# Patient Record
Sex: MALE | Race: BLACK OR AFRICAN AMERICAN
[De-identification: names, ages, dates, MRNs, and addresses within clinical notes are randomized per-mention and may not be internally consistent; named-entity substitution may affect disease eponyms.]

---

## 2017-09-16 ENCOUNTER — HOSPITAL ENCOUNTER (INPATIENT)
Dept: HOSPITAL 24 - ER | Age: 62
LOS: 2 days | Discharge: HOME | DRG: 291 | End: 2017-09-18
Attending: INTERNAL MEDICINE | Admitting: INTERNAL MEDICINE
Payer: COMMERCIAL

## 2017-09-16 VITALS — BODY MASS INDEX: 23.9 KG/M2

## 2017-09-16 DIAGNOSIS — E88.09: ICD-10-CM

## 2017-09-16 DIAGNOSIS — J96.21: ICD-10-CM

## 2017-09-16 DIAGNOSIS — R94.31: ICD-10-CM

## 2017-09-16 DIAGNOSIS — E83.42: ICD-10-CM

## 2017-09-16 DIAGNOSIS — J96.22: ICD-10-CM

## 2017-09-16 DIAGNOSIS — J44.9: ICD-10-CM

## 2017-09-16 DIAGNOSIS — I50.23: Primary | ICD-10-CM

## 2017-09-16 DIAGNOSIS — I10: ICD-10-CM

## 2017-09-16 LAB
ALBUMIN SERPL BCP-MCNC: 2.9 G/DL (ref 3.4–5)
ALP SERPL-CCNC: 94 UNITS/L (ref 46–116)
ALT SERPL W P-5'-P-CCNC: 29 UNITS/L (ref 12–78)
AMORPH SED URNS QL MICRO: (no result) /HPF
ARTERIAL PATENCY WRIST A: (no result)
AST SERPL W P-5'-P-CCNC: 37 UNITS/L (ref 15–37)
BACTERIA URNS QL MICRO: NEGATIVE /HPF
BASE EXCESS BLDA CALC-SCNC: 0.9 MMOL/L (ref -2–2)
BASE EXCESS BLDA CALC-SCNC: 1.9 MMOL/L (ref -2–2)
BASE EXCESS BLDA CALC-SCNC: 3.6 MMOL/L (ref -2–2)
BASOPHILS # BLD AUTO: 0 X10^3/UL (ref 0–0.1)
BASOPHILS NFR BLD AUTO: 0.5 % (ref 0.2–1)
BILIRUB UR QL STRIP.AUTO: NEGATIVE
BUN SERPL-MCNC: 11 MG/DL (ref 7–18)
CALCIUM ALBUM COR SERPL-SCNC: 145 MMOL/L (ref 136–145)
CALCIUM ALBUM COR SERPL-SCNC: 9 MG/DL (ref 8.5–10.1)
CALCIUM SERPL-MCNC: 8.1 MG/DL (ref 8.5–10.1)
CHLORIDE SERPL-SCNC: 108 MMOL/L (ref 98–107)
CK MB SERPL-MCNC: 3.3 NG/ML (ref 0–4)
CK MB SERPL-MCNC: 4.6 NG/ML (ref 0–4)
CK MB SERPL-MCNC: 4.7 NG/ML (ref 0–4)
CK SERPL-CCNC: 245 UNITS/L (ref 39–308)
CK SERPL-CCNC: 270 UNITS/L (ref 39–308)
CK SERPL-CCNC: 272 UNITS/L (ref 39–308)
CKMB %: 1.4 % (ref ?–4)
CKMB %: 1.7 % (ref ?–4)
CKMB %: 1.7 % (ref ?–4)
CLARITY UR: CLEAR
CO2 SERPL-SCNC: 31.5 MMOL/L (ref 21–32)
COLOR UR AUTO: (no result)
CREAT SERPL-MCNC: 1.41 MG/DL (ref 0.7–1.3)
EGFR  BLACK RACES: > 60 (ref 60–?)
EOSINOPHIL # BLD AUTO: 0.3 X10^3/UL (ref 0–0.2)
EOSINOPHIL NFR BLD AUTO: 3.4 % (ref 0.9–2.9)
ERYTHROCYTE [DISTWIDTH] IN BLOOD BY AUTOMATED COUNT: 14.5 % (ref 11.6–16.5)
FRACTIONATED INSPIRED OXYGEN: 100
FRACTIONATED INSPIRED OXYGEN: 100
FRACTIONATED INSPIRED OXYGEN: 40
GLUCOSE UR QL STRIP.AUTO: NEGATIVE
HCO3 BLDA-SCNC: 30 MMOL/L (ref 22–26)
HCO3 BLDA-SCNC: 30.1 MMOL/L (ref 22–26)
HCO3 BLDA-SCNC: 30.4 MMOL/L (ref 22–26)
HCT VFR BLD AUTO: 39.5 % (ref 42–54)
HGB BLD-MCNC: 13 G/DL (ref 13.5–18)
KETONES UR QL STRIP.AUTO: NEGATIVE
LEUKOCYTE ESTERASE UR QL STRIP.AUTO: NEGATIVE
LYMPHOCYTES # BLD AUTO: 2.6 X10^3/UL (ref 1.3–2.9)
LYMPHOCYTES NFR BLD AUTO: 27.7 % (ref 21–51)
MAGNESIUM SERPL-MCNC: 1.2 MG/DL (ref 1.7–2.9)
MCH RBC QN AUTO: 29.1 PG (ref 27–34)
MCHC RBC AUTO-ENTMCNC: 32.8 G/DL (ref 33–35)
MCV RBC AUTO: 88.5 FL (ref 80–100)
MONOCYTES # BLD AUTO: 0.7 X10^3/UL (ref 0.3–0.8)
MONOCYTES NFR BLD AUTO: 7.7 % (ref 0–13)
NEUTROPHILS # BLD AUTO: 5.8 X10^3/UL (ref 2.2–4.8)
NEUTROPHILS NFR BLD AUTO: 60.7 % (ref 42–75)
NITRITE UR QL STRIP.AUTO: NEGATIVE
PCO2 BLDA: 55 MMHG (ref 35–45)
PCO2 BLDA: 64 MMHG (ref 35–45)
PCO2 BLDA: 70 MMHG (ref 35–45)
PH BLDA: 7.24 [PH] (ref 7.35–7.45)
PH BLDA: 7.28 [PH] (ref 7.35–7.45)
PH BLDA: 7.35 [PH] (ref 7.35–7.45)
PH UR STRIP.AUTO: 6 [PH] (ref 5–8)
PHOSPHATE SERPL-MCNC: 3.9 MG/DL (ref 2.6–4.7)
PLATELET # BLD: 292 X10^3/UL (ref 150–450)
PMV BLD AUTO: 7.8 FL (ref 7.4–11)
PO2 BLDA: 140 MMHG (ref 80–100)
PO2 BLDA: 199 MMHG (ref 80–100)
PO2 BLDA: 447 MMHG (ref 80–100)
PROT SERPL-MCNC: 7.2 G/DL (ref 6.4–8.2)
PROT UR QL STRIP.AUTO: (no result)
RBC # BLD AUTO: 4.46 X10^6/UL (ref 4.7–6)
RBC # UR STRIP.AUTO: (no result) /UL
SAO2 % BLDA: 100 % (ref 90–100)
SAO2 % BLDA: 100 % (ref 90–100)
SAO2 % BLDA: 99 % (ref 90–100)
SODIUM SERPL-SCNC: 144 MMOL/L (ref 136–145)
SP GR UR STRIP.AUTO: 1 (ref 1–1.03)
SQUAMOUS URNS QL MICRO: (no result) /HPF
TROPONIN I SERPL-MCNC: 0.12 NG/ML (ref 0–1.5)
TROPONIN I SERPL-MCNC: 0.17 NG/ML (ref 0–1.5)
TROPONIN I SERPL-MCNC: 0.22 NG/ML (ref 0–1.5)
UROBILINOGEN UR QL STRIP.AUTO: NORMAL
WBC NRBC COR # BLD AUTO: 9.5 X10^3/UL (ref 3.6–10)

## 2017-09-16 PROCEDURE — 36415 COLL VENOUS BLD VENIPUNCTURE: CPT

## 2017-09-16 PROCEDURE — 83735 ASSAY OF MAGNESIUM: CPT

## 2017-09-16 PROCEDURE — P9047 ALBUMIN (HUMAN), 25%, 50ML: HCPCS

## 2017-09-16 PROCEDURE — 82803 BLOOD GASES ANY COMBINATION: CPT

## 2017-09-16 PROCEDURE — 82553 CREATINE MB FRACTION: CPT

## 2017-09-16 PROCEDURE — 84484 ASSAY OF TROPONIN QUANT: CPT

## 2017-09-16 PROCEDURE — 85025 COMPLETE CBC W/AUTO DIFF WBC: CPT

## 2017-09-16 PROCEDURE — 99284 EMERGENCY DEPT VISIT MOD MDM: CPT

## 2017-09-16 PROCEDURE — 94640 AIRWAY INHALATION TREATMENT: CPT

## 2017-09-16 PROCEDURE — 71010: CPT

## 2017-09-16 PROCEDURE — 96374 THER/PROPH/DIAG INJ IV PUSH: CPT

## 2017-09-16 PROCEDURE — 51702 INSERT TEMP BLADDER CATH: CPT

## 2017-09-16 PROCEDURE — 96375 TX/PRO/DX INJ NEW DRUG ADDON: CPT

## 2017-09-16 PROCEDURE — 80053 COMPREHEN METABOLIC PANEL: CPT

## 2017-09-16 PROCEDURE — 93005 ELECTROCARDIOGRAM TRACING: CPT

## 2017-09-16 PROCEDURE — 94660 CPAP INITIATION&MGMT: CPT

## 2017-09-16 PROCEDURE — 83880 ASSAY OF NATRIURETIC PEPTIDE: CPT

## 2017-09-16 PROCEDURE — 84100 ASSAY OF PHOSPHORUS: CPT

## 2017-09-16 PROCEDURE — 81001 URINALYSIS AUTO W/SCOPE: CPT

## 2017-09-16 PROCEDURE — 36600 WITHDRAWAL OF ARTERIAL BLOOD: CPT

## 2017-09-16 PROCEDURE — 96367 TX/PROPH/DG ADDL SEQ IV INF: CPT

## 2017-09-16 PROCEDURE — 96365 THER/PROPH/DIAG IV INF INIT: CPT

## 2017-09-16 PROCEDURE — 82550 ASSAY OF CK (CPK): CPT

## 2017-09-16 RX ADMIN — SODIUM CHLORIDE SCH MLS/HR: 9 INJECTION, SOLUTION INTRAVENOUS at 20:22

## 2017-09-16 RX ADMIN — LOSARTAN POTASSIUM SCH MG: 50 TABLET, FILM COATED ORAL at 20:13

## 2017-09-16 RX ADMIN — SODIUM CHLORIDE SCH ML: 9 INJECTION, SOLUTION INTRAMUSCULAR; INTRAVENOUS; SUBCUTANEOUS at 18:35

## 2017-09-16 RX ADMIN — FUROSEMIDE SCH: 10 INJECTION, SOLUTION INTRAVENOUS at 09:52

## 2017-09-16 RX ADMIN — IPRATROPIUM BROMIDE AND ALBUTEROL SULFATE SCH EA: .5; 3 SOLUTION RESPIRATORY (INHALATION) at 04:30

## 2017-09-16 RX ADMIN — FUROSEMIDE SCH MG: 10 INJECTION, SOLUTION INTRAVENOUS at 08:30

## 2017-09-16 RX ADMIN — AMLODIPINE BESYLATE SCH MG: 5 TABLET ORAL at 12:23

## 2017-09-16 RX ADMIN — POTASSIUM CHLORIDE SCH MEQ: 1500 TABLET, EXTENDED RELEASE ORAL at 20:12

## 2017-09-16 RX ADMIN — IPRATROPIUM BROMIDE AND ALBUTEROL SULFATE SCH: .5; 3 SOLUTION RESPIRATORY (INHALATION) at 06:34

## 2017-09-16 RX ADMIN — IPRATROPIUM BROMIDE AND ALBUTEROL SULFATE SCH EA: .5; 3 SOLUTION RESPIRATORY (INHALATION) at 21:21

## 2017-09-16 RX ADMIN — IPRATROPIUM BROMIDE AND ALBUTEROL SULFATE SCH EA: .5; 3 SOLUTION RESPIRATORY (INHALATION) at 12:25

## 2017-09-16 RX ADMIN — POTASSIUM CHLORIDE SCH MEQ: 1500 TABLET, EXTENDED RELEASE ORAL at 12:23

## 2017-09-16 RX ADMIN — ENALAPRIL MALEATE SCH: 10 TABLET ORAL at 09:52

## 2017-09-16 RX ADMIN — SODIUM CHLORIDE SCH: 9 INJECTION, SOLUTION INTRAVENOUS at 09:53

## 2017-09-16 RX ADMIN — ENALAPRIL MALEATE SCH MG: 10 TABLET ORAL at 08:30

## 2017-09-16 RX ADMIN — FUROSEMIDE SCH MG: 10 INJECTION, SOLUTION INTRAVENOUS at 20:11

## 2017-09-16 RX ADMIN — SODIUM CHLORIDE SCH ML: 9 INJECTION, SOLUTION INTRAMUSCULAR; INTRAVENOUS; SUBCUTANEOUS at 23:17

## 2017-09-16 RX ADMIN — IPRATROPIUM BROMIDE AND ALBUTEROL SULFATE SCH EA: .5; 3 SOLUTION RESPIRATORY (INHALATION) at 08:27

## 2017-09-16 RX ADMIN — IPRATROPIUM BROMIDE AND ALBUTEROL SULFATE SCH EA: .5; 3 SOLUTION RESPIRATORY (INHALATION) at 16:25

## 2017-09-16 NOTE — DR.PEDCOUG
HPI





- Time Seen


Time seen: 04:10





- Complaint


Chief Complaint Doctors Comments: Patient presented to the ED via POV with 

complaint that could not breath. He was diaphoretic in respiratory distress 

with intercostal rectractions. He was hypertensive /141.





PMH





- Past Surgical History


Past Surgical History: Yes (unknown)





- Vaccines


Hx Measles, Mumps, Rubella Vaccination: Yes


Hx Varicella Vaccination: Yes


Pneumococcal Vaccine Every 5 Yrs: No


Hx Meningococcal Vaccination: Yes





ROS (Ped)





- Review of Systems


Constitutional: Diaphoresis


Eyes: No Symptoms Reported


ENTM: No Symptoms Reported


Respiratoy: Short of Breath


Cardiovascular: No Symptoms Reported


Gastrointestinal/Abdominal: No Symptoms Reported


Genitourinary: No Symptoms Reported


Neurological: No Symptoms Reported


Musculoskeletal: No Symptoms Reported


Integumentary: No Symptoms Reported


Hematologic/Lymphatic: No Symptoms Reported


Endocrine: No Symptoms Reported


Psychiatric: No Symptoms Reported


All Other Systems: Reviewed and Negative





PE





- Vitals


Vitals: 


 





Temperature                      99.0 F


Pulse Rate [Apical]              89


Pulse Rate                       110


Respiratory Rate                 22


Blood Pressure [Right Arm]       166/94


Blood Pressure [Left Arm]        158/86


Blood Pressure                   258/134


O2 Sat by Pulse Oximetry         100











- General


Limitations: No Limitations





- Head


Head Exam: Normal Inspection, Atraumatic





- Eyes


Eye exam: Normal Appearance, PERRL, EOMI





- ENT


ENT Exam: Normal Exam


External Ear Exam: Normal External Inspection


TM/Canal Exam: Bilateral Normal


Nose Exam: Normal Nose Exam


Nasal Speculum Exam: Bilateral Normal


Mouth Exam: Normal Inspection


Teeth Exam: Normal Inspection


Throat Exam: Normal Inspection





- Neck


Neck Exam: Other (supraclavicular retraction)





- Chest


Chest Inspection: Normal Inspection, Other (intercostal retraction)





- Respiratory


Respiratory Exam: Accessory Muscle Use, Respiratory Distress


Respiratory Exam: Bilateral Wheezing (inspiratory/exp)





- Cardiovascular


Cardiovascular Exam: Tachycardia





- Abdominal Exam


Abdominal Exam: Normal Inspection


Abdominal Tenderness: negative: RUQ, RLQ, LUQ, LLQ, Epigastrium, Suprapubic, 

Diffuse, Mild, Moderate, Severe, Other





- Extremities


Extremities Exam: Normal Inspection, Full ROM





- Back


Back Exam: Normal Inspection





- Neurologic


Neurological Exam: Alert, Oriented X3, CN II-XII Intact





- Psychiatric


Psychiatric Exam: Normal Affect





- Skin


Skin Exam: Warm, Dry, Intact





Course





- Treatment


Treatment: DuoNebs,oxygen,antihypertensive,NTG, morphine, Non Rebreather





- Reevaluation


1st: Improved





- Consultation


Called: 06:10 (Dr Landrum agreed to admit for further evaluation and treatment)





ROR





- Labs Reviewed


Laboratory Results Reviewed?: Yes


Result Diagrams: 


 09/16/17 04:15





 09/16/17 04:15


Laboratory: 


 











WBC  9.5 X10^3/uL (3.6-10.0)   09/16/17  04:15    


 


RBC  4.46 X10^6/uL (4.7-6.0)  L  09/16/17  04:15    


 


Hgb  13.0 g/dL (13.5-18.0)  L  09/16/17  04:15    


 


Hct  39.5 % (42.0-54.0)  L  09/16/17  04:15    


 


MCV  88.5 fL (80.0-100.0)   09/16/17  04:15    


 


MCH  29.1 pg (27.0-34.0)   09/16/17  04:15    


 


MCHC  32.8 g/dL (33.0-35.0)  L  09/16/17  04:15    


 


RDW  14.5 % (11.6-16.5)   09/16/17  04:15    


 


Plt Count  292 X10^3/uL (150.0-450.0)   09/16/17  04:15    


 


MPV  7.8 fL (7.4-11.0)   09/16/17  04:15    


 


Neut %  60.7 % (42.0-75.0)   09/16/17  04:15    


 


Lymph %  27.7 % (21.0-51.0)   09/16/17  04:15    


 


Mono %  7.7 % (0.0-13.0)   09/16/17  04:15    


 


Eos %  3.4 % (0.9-2.9)  H  09/16/17  04:15    


 


Baso %  0.5 % (0.2-1.0)   09/16/17  04:15    


 


Neut #  5.8 x10^3/uL (2.2-4.8)  H  09/16/17  04:15    


 


Lymph #  2.6 X10^3/uL (1.3-2.9)   09/16/17  04:15    


 


Mono #  0.7 x10^3/uL (0.3-0.8)   09/16/17  04:15    


 


Eos #  0.3 x10^3/uL (0.0-0.2)  H  09/16/17  04:15    


 


Baso #  0.0 X10^3/uL (0.0-0.1)   09/16/17  04:15    


 


Absolute Nucleated RBC  0.0 /100WBC  09/16/17  04:15    


 


Sample Site  R rad   09/16/17  05:33    


 


ABG pH  7.240  (7.35-7.45)  L  09/16/17  05:33    


 


ABG pCO2  70.0 mmHg (35.0-45.0)  H*  09/16/17  05:33    


 


ABG pO2  447.0 mmHg (80.0-100.0)  H  09/16/17  05:33    


 


ABG HCO3  30.0 mmol/L (22-26)  H  09/16/17  05:33    


 


ABG O2 Saturation  100.0 % ()   09/16/17  05:33    


 


ABG Base Excess  0.9 mmol/L (-2.0-2.0)   09/16/17  05:33    


 


Eze Test  Pos   09/16/17  05:33    


 


A-a Gradient  179.0 mmHg  09/16/17  05:33    


 


FiO2  100.000   09/16/17  05:33    


 


Blood Gas Comments  Yaima well, afh   09/16/17  05:33    


 


Sodium  144 mmol/L (136-145)   09/16/17  04:15    


 


Corrected Sodium  145 mmol/L (136-145)   09/16/17  04:15    


 


Potassium  3.4 mmol/L (3.5-5.1)  L  09/16/17  04:15    


 


Chloride  108 mmol/L ()  H  09/16/17  04:15    


 


Carbon Dioxide  31.5 mmol/L (21-32)   09/16/17  04:15    


 


BUN  11 mg/dL (7-18)   09/16/17  04:15    


 


Creatinine  1.41 mg/dL (0.70-1.30)  H  09/16/17  04:15    


 


Est GFR (MDRD) Af Amer  > 60  (>60)   09/16/17  04:15    


 


Est GFR (MDRD) Non-Af  54  (>60)  L  09/16/17  04:15    


 


Glucose  127 mg/dL (65-99)  H  09/16/17  04:15    


 


Calcium  8.1 mg/dL (8.5-10.1)  L  09/16/17  04:15    


 


Corrected Calcium  9.0 mg/dL (8.5-10.1)   09/16/17  04:15    


 


Phosphorus  3.9 mg/dL (2.6-4.7)   09/16/17  04:15    


 


Magnesium  1.2 mg/dL (1.7-2.9)  L  09/16/17  04:15    


 


Total Bilirubin  0.40 mg/dL (0.2-1.0)   09/16/17  04:15    


 


AST  37 Units/L (15-37)   09/16/17  04:15    


 


ALT  29 Units/L (12-78)   09/16/17  04:15    


 


Alkaline Phosphatase  94 Units/L ()   09/16/17  04:15    


 


Creatine Kinase  245 Units/L ()   09/16/17  04:15    


 


CK-MB (CK-2)  3.3 ng/mL (0-4.0)   09/16/17  04:15    


 


CK/CKMB % Calc  1.4 % (<4)   09/16/17  04:15    


 


Troponin I  0.22 ng/mL (0-1.5)   09/16/17  04:15    


 


B-Natriuretic Peptide  274 pg/mL (0-79)  H  09/16/17  04:15    


 


Total Protein  7.2 g/dL (6.4-8.2)   09/16/17  04:15    


 


Albumin  2.9 g/dL (3.4-5.0)  L  09/16/17  04:15    


 


Globulin  4.3 g/dL (2.5-4.5)   09/16/17  04:15    


 


Albumin/Globulin Ratio  0.7 Ratio (1.1-2.1)  L  09/16/17  04:15    














- XRAY


XRAY Interpreted by: Radiologist (Chest: The heart is moderately enlarged and 

there is central vascular congestion.  The interstitial markings are prominent 

bilaterally.  No pneumothorax or pleural effusion.  No focal lung parenchymal 

consolidation identified. )





- Diagnosis


Discharge Problem: 


CHF (congestive heart failure), NYHA class II


Qualifiers:


 Congestive heart failure type: systolic Congestive heart failure chronicity: 

acute on chronic Qualified Code(s): I50.23 - Acute on chronic systolic (

congestive) heart failure








- Discharge Plan


Condition: Stable





- Follow ups/Referrals


Follow ups/Referrals: 


HILLARY LARIOS [Primary Care Provider] - 3 days





- Instructions

## 2017-09-16 NOTE — DR.H&P
H&P





- History & Physical for Day of:


H&P Date: 09/16/17





- Chief Complaint


Chief Complaint: SHORTNESS OF BREATH





- Allergies


Allergies/Adverse Reactions: 


 Allergies











Allergy/AdvReac Type Severity Reaction Status Date / Time


 


lisinopril Allergy   Verified 09/16/17 06:21














- History of Present Illness


History of Present Illness:  IS A 62 YEAR OLD PATIENT OF OURS WHO 

PRESENTED TO THE EMERGENCY ROOM WITH COMPLAINTS OF DIFFICULTY BREATHING. ON 

ARRIVAL, PATIENT WAS IN OBVIOUS RESPIRATORY DISTESS. HE WAS NOTED WITH 

INTERCOSTAL RETRACTIONS AND WAS DIAPHORETIC. PATIENT REPORTED THAT SYMPTOMS 

BEGAN 1 HOUR PRIOR TO ARRIVAL TO ER. PATIENT WAS ALSO NOTED TO BE HYPERTENSIVE 

ON ARRIVAL TO ER WITH BLOOD PRESSURE NOTED /134. OTHER VITAL SIGNS ON 

ARRIVAL ARE 99.0-135-36-93%. LABS AND XRAYS WERE OBTAINED. ABNORMAL LAB VALUES 

INCLUDE RBC 4.46, HGB 13, HCT 39.5, POTASSIUM 3.4, CHLORIDE 108, CREATININE 1.41

, GLUCOSE 127, CALCIUM 8.1, MAGNESIUM 1.2, ALBUMIN 2.9.  URINALYSIS REPORTED 

WBC 1-4, RBC 4-8, PROTEIN 3+, OCCULT BLOOD 2+. ABG REPORTED PH 7.280, PC02 64, 

P02 199, HC03 30.1. CHEST XRAY REPORTED FINDINGS MOST CHARACTERISTIC OF CHANGES 

ASSOCIATED WITH CONGESTIVE HEART FAIURE. THERE IS CARDIOMEGALY, CENTRAL 

VASCULAR CONGESTION, AND INTERSTITIAL EDEMA. PATIENT WAS PLACED ON BIPAP. HE 

WAS GIVEN SOLUMEDROL 125MG, NORMODYNE 20MG IV, DECADRON TX, ATIVAN 1MG IV, 

LASIX 40MG IV, MORPHINE 4MG IV, NITRO-BID OINTMENT TO CHEST WALL, MAGNESIUM 

RIDER X 2, AND VASOTEC 10MG. WE ADMITTED PATIENT FOR FURTHER TREATMENT AND 

EVALUATION OF CHF. HE WAS STARTED ON NS AT 50ML/HR, LASIX 40MG IV BID, DUONEB TX

, LISOPRIL 10MG DAILY, ZOFRAN 4MG IV Q8H PRN, LOSARTAN 100MG HS AMLODIPINE 5MG 

DAILY, AND POTASSIUM 40MEQ PO BID X 2 DOSES. WE PLAN TO RECHECK AM LABS AND 

CONTINUE TO MONITOR PATIENT.





- Past Medical History


Past Medical History: Arthritis, Asthma, CHF, COPD, CVA, Hypertension, PUD





- Past Surgical History


Additional Surgical History: Prostate Surgery, Right Cataract Surgery





- Family History


Family Medical History: Hypertension





- Social History


Does patient currently use any type of tobacco product: Yes


Have you used tobacco products in the last 12 months: Yes


Type of Tobacco Use: Cigarettes


How many years tobacco product used: 50


Alcohol Use: Occasionally


Drug Use: None





- Medications


Home Medications: 


 





Amlodipine Besylate [NORVASC 10 MG *] 10 mg PO DAILY 09/16/17 [History 

Confirmed 09/16/17]


Aspirin [ASPIRIN 325 MG *] 325 mg PO DAILY 09/16/17 [History Confirmed 09/16/17]


Budesonide-Formoterol [SYMBICORT /4.5 mcg] 1 puff IN Q12H 09/16/17 [

History Confirmed 09/16/17]


Hydralazine HCl 25 mg PO DAILY 09/16/17 [History Confirmed 09/16/17]


Metoprolol Tartrate [Lopressor Tab 50 mg] 100 mg PO HS 09/16/17 [History 

Confirmed 09/16/17]


Tamsulosin HCl [Flomax] 0.4 mg PO DAILY 09/16/17 [History Confirmed 09/16/17]











- Review of Systems


Constitutional: No Symptoms Reported


Eyes: No Symptoms Reported


ENT: No Symptoms Reported


Respiratory: See HPI, Shortness of Breath, Wheezing


Cardiovascular: No Symptoms Reported


Gastrointestinal: No Symptoms Reported


Genitourinary: No Symptoms Reported


Musculoskeletal: No Symptoms Reported


Skin: No Symptoms Reported


Neurological: No Symptoms Reported





- Physical Exam


Vital Signs: 


 





Temperature                      98.4 F


Pulse Rate [Apical]              88


Pulse Rate                       100


Respiratory Rate                 23


Blood Pressure [Right Arm]       191/87


Blood Pressure [Left Arm]        158/86


Blood Pressure                   258/134


O2 Sat by Pulse Oximetry         95








Oriented: Normal


Eyes: Normal


Ear: Normal


Nose: Normal


Throat: Normal


Respiratory: Wheezes Throughout


Cardiovascular: Tachycardia


: Normal


Auscultation: Bowel Sounds: Normal


Palpation: Normal


Tenderness: Normal


Skin: Normal


Musculoskeletal: Normal


Psychiatric: Normal


Mood Description: Calm


Affect: Normal


Speech Pattern: Clear





- Assessment/Plan


(1) CHF (congestive heart failure), NYHA class II


Qualifiers: 


   Congestive heart failure type: systolic   Congestive heart failure chronicity

: acute on chronic   Qualified Code(s): I50.23 - Acute on chronic systolic (

congestive) heart failure   


Status: Acute   


Plan: LASIX 40MG IV BID, DUONEBS, CONTINUE TO MONTITOR   





(2) Acute and chronic respiratory failure (acute-on-chronic)


Qualifiers: 


   Respiratory failure complication: hypoxia and hypercapnia   Qualified Code(s)

: J96.21 - Acute and chronic respiratory failure with hypoxia; J96.22 - Acute 

and chronic respiratory failure with hypercapnia   


Status: Acute   


Plan: DUONEB TX, PULMOCORT TX, LASIX 40MG IV BID, SUPPLEMENTAL OXYGEN, TELEMETRY

, CONTINUE TO MONITOR   





(3) COPD (chronic obstructive pulmonary disease)


Qualifiers: 


   COPD type: unspecified COPD   Qualified Code(s): J44.9 - Chronic obstructive 

pulmonary disease, unspecified   


Status: Acute   


Plan: DUONEB TX, PULMOCORT TX, LASIX 40MG IV BID, SUPPLEMENTAL OXYGEN, TELEMETRY

, CONTINUE TO MONITOR   





(4) Hypertension


Qualifiers: 


   Hypertension type: essential hypertension   Qualified Code(s): I10 - 

Essential (primary) hypertension   


Status: Acute   


Plan: LISINOPRIL 10MG DAILY, LOSARTAN 100MG HS, AMLODIPINE 5MG DAILY

## 2017-09-16 NOTE — RAD
EXAM:   Chest X-ray



INDICATION:  

Shortness of breath

 

COMPARISION:  

Prior exam from December 31, 2016

 

TECHNIQUE:  

Single view

 

FINDINGS:  

The heart is moderately enlarged and there is central vascular congestion. The interstitial markings 
are prominent bilaterally. No pneumothorax or pleural effusion. No focal lung parenchymal consolidati
on identified. The regional skeleton is intact.

 

IMPRESSION:  

Findings are most characteristic of changes associated congestive heart failure. There is cardiomegal
y, central vascular congestion, and interstitial edema.





Reported By:Electronically Signed by ANASTASIIA SNELL MD at 9/16/2017 4:55:10 AM

## 2017-09-17 LAB
ALBUMIN SERPL BCP-MCNC: 2.5 G/DL (ref 3.4–5)
ALP SERPL-CCNC: 74 UNITS/L (ref 46–116)
ALT SERPL W P-5'-P-CCNC: 30 UNITS/L (ref 12–78)
ARTERIAL PATENCY WRIST A: (no result)
AST SERPL W P-5'-P-CCNC: 30 UNITS/L (ref 15–37)
BASE EXCESS BLDA CALC-SCNC: 7 MMOL/L (ref -2–2)
BASOPHILS # BLD AUTO: 0 X10^3/UL (ref 0–0.1)
BASOPHILS NFR BLD AUTO: 0 % (ref 0.2–1)
BUN SERPL-MCNC: 24 MG/DL (ref 7–18)
CALCIUM ALBUM COR SERPL-SCNC: (no result) MMOL/L (ref 136–145)
CALCIUM ALBUM COR SERPL-SCNC: 9.6 MG/DL (ref 8.5–10.1)
CALCIUM SERPL-MCNC: 8.4 MG/DL (ref 8.5–10.1)
CHLORIDE SERPL-SCNC: 106 MMOL/L (ref 98–107)
CO2 SERPL-SCNC: 29.5 MMOL/L (ref 21–32)
CREAT SERPL-MCNC: 1.48 MG/DL (ref 0.7–1.3)
EGFR  BLACK RACES: > 60 (ref 60–?)
EOSINOPHIL # BLD AUTO: 0 X10^3/UL (ref 0–0.2)
EOSINOPHIL NFR BLD AUTO: 0 % (ref 0.9–2.9)
ERYTHROCYTE [DISTWIDTH] IN BLOOD BY AUTOMATED COUNT: 14.2 % (ref 11.6–16.5)
FRACTIONATED INSPIRED OXYGEN: 21
HCO3 BLDA-SCNC: 32 MMOL/L (ref 22–26)
HCT VFR BLD AUTO: 35.5 % (ref 42–54)
HGB BLD-MCNC: 11.9 G/DL (ref 13.5–18)
LYMPHOCYTES # BLD AUTO: 0.6 X10^3/UL (ref 1.3–2.9)
LYMPHOCYTES NFR BLD AUTO: 5 % (ref 21–51)
MCH RBC QN AUTO: 28.9 PG (ref 27–34)
MCHC RBC AUTO-ENTMCNC: 33.5 G/DL (ref 33–35)
MCV RBC AUTO: 86.2 FL (ref 80–100)
MONOCYTES # BLD AUTO: 0.7 X10^3/UL (ref 0.3–0.8)
MONOCYTES NFR BLD AUTO: 6 % (ref 0–13)
NEUTROPHILS # BLD AUTO: 10 X10^3/UL (ref 2.2–4.8)
NEUTROPHILS NFR BLD AUTO: 89 % (ref 42–75)
PCO2 BLDA: 46 MMHG (ref 35–45)
PH BLDA: 7.45 [PH] (ref 7.35–7.45)
PLATELET # BLD: 238 X10^3/UL (ref 150–450)
PMV BLD AUTO: 8.5 FL (ref 7.4–11)
PO2 BLDA: 86 MMHG (ref 80–100)
PROT SERPL-MCNC: 6.1 G/DL (ref 6.4–8.2)
RBC # BLD AUTO: 4.12 X10^6/UL (ref 4.7–6)
SAO2 % BLDA: 97 % (ref 90–100)
SODIUM SERPL-SCNC: 142 MMOL/L (ref 136–145)
WBC NRBC COR # BLD AUTO: 11.2 X10^3/UL (ref 3.6–10)

## 2017-09-17 RX ADMIN — FUROSEMIDE SCH MG: 10 INJECTION, SOLUTION INTRAVENOUS at 08:24

## 2017-09-17 RX ADMIN — MAGNESIUM SULFATE HEPTAHYDRATE SCH MLS/HR: 1 INJECTION, SOLUTION INTRAVENOUS at 08:17

## 2017-09-17 RX ADMIN — ALBUMIN (HUMAN) SCH MLS/HR: 0.25 INJECTION, SOLUTION INTRAVENOUS at 10:18

## 2017-09-17 RX ADMIN — AMLODIPINE BESYLATE SCH MG: 5 TABLET ORAL at 08:24

## 2017-09-17 RX ADMIN — LOSARTAN POTASSIUM SCH MG: 50 TABLET, FILM COATED ORAL at 20:08

## 2017-09-17 RX ADMIN — MAGNESIUM SULFATE HEPTAHYDRATE SCH MLS/HR: 1 INJECTION, SOLUTION INTRAVENOUS at 07:15

## 2017-09-17 RX ADMIN — SODIUM CHLORIDE SCH ML: 9 INJECTION, SOLUTION INTRAMUSCULAR; INTRAVENOUS; SUBCUTANEOUS at 23:17

## 2017-09-17 RX ADMIN — IPRATROPIUM BROMIDE AND ALBUTEROL SULFATE SCH EA: .5; 3 SOLUTION RESPIRATORY (INHALATION) at 01:30

## 2017-09-17 RX ADMIN — IPRATROPIUM BROMIDE AND ALBUTEROL SULFATE SCH: .5; 3 SOLUTION RESPIRATORY (INHALATION) at 09:10

## 2017-09-17 RX ADMIN — SODIUM CHLORIDE SCH MLS/HR: 9 INJECTION, SOLUTION INTRAVENOUS at 21:59

## 2017-09-17 RX ADMIN — IPRATROPIUM BROMIDE AND ALBUTEROL SULFATE SCH EA: .5; 3 SOLUTION RESPIRATORY (INHALATION) at 20:26

## 2017-09-17 RX ADMIN — BUDESONIDE SCH EA: 0.5 INHALANT RESPIRATORY (INHALATION) at 23:15

## 2017-09-17 RX ADMIN — METOPROLOL SUCCINATE SCH MG: 25 TABLET, EXTENDED RELEASE ORAL at 10:21

## 2017-09-17 RX ADMIN — FUROSEMIDE SCH MG: 10 INJECTION, SOLUTION INTRAVENOUS at 20:08

## 2017-09-17 RX ADMIN — SODIUM CHLORIDE SCH: 9 INJECTION, SOLUTION INTRAMUSCULAR; INTRAVENOUS; SUBCUTANEOUS at 18:35

## 2017-09-17 RX ADMIN — SODIUM CHLORIDE SCH ML: 9 INJECTION, SOLUTION INTRAMUSCULAR; INTRAVENOUS; SUBCUTANEOUS at 06:17

## 2017-09-17 RX ADMIN — SODIUM CHLORIDE SCH: 9 INJECTION, SOLUTION INTRAVENOUS at 10:21

## 2017-09-17 RX ADMIN — IPRATROPIUM BROMIDE AND ALBUTEROL SULFATE SCH EA: .5; 3 SOLUTION RESPIRATORY (INHALATION) at 16:34

## 2017-09-17 RX ADMIN — MAGNESIUM SULFATE HEPTAHYDRATE SCH MLS/HR: 1 INJECTION, SOLUTION INTRAVENOUS at 07:16

## 2017-09-17 RX ADMIN — IPRATROPIUM BROMIDE AND ALBUTEROL SULFATE SCH EA: .5; 3 SOLUTION RESPIRATORY (INHALATION) at 05:36

## 2017-09-17 RX ADMIN — IPRATROPIUM BROMIDE AND ALBUTEROL SULFATE SCH EA: .5; 3 SOLUTION RESPIRATORY (INHALATION) at 13:22

## 2017-09-17 NOTE — RAD
HISTORY:  CHF



Study: AP chest. 



Comparison: 9/16/2017



Findings:

Allowing for differences in technique, positioning, and degree of inspiration, there has been no sign
ificant interval change in the radiographic appearance of the chest.  

Mild prominence the central pulmonary vasculature and perihilar interstitium noted. No focal airspace
 opacities are seen.

Cardiac silhouette is mildly enlarged, stable.  



IMPRESSION: 

 

1.  No significant interval change with findings as above. Stable cardiomegaly and mild venous conges
tive changes are noted.





Reported By:Electronically Signed by BARRETT LEYVA MD at 9/17/2017 7:03:13 AM

## 2017-09-18 VITALS — DIASTOLIC BLOOD PRESSURE: 99 MMHG | SYSTOLIC BLOOD PRESSURE: 134 MMHG

## 2017-09-18 LAB
ALBUMIN SERPL BCP-MCNC: 2.9 G/DL (ref 3.4–5)
ALP SERPL-CCNC: 74 UNITS/L (ref 46–116)
ALT SERPL W P-5'-P-CCNC: 27 UNITS/L (ref 12–78)
AST SERPL W P-5'-P-CCNC: 25 UNITS/L (ref 15–37)
BASOPHILS # BLD AUTO: 0 X10^3/UL (ref 0–0.1)
BASOPHILS NFR BLD AUTO: 0.3 % (ref 0.2–1)
BUN SERPL-MCNC: 21 MG/DL (ref 7–18)
CALCIUM ALBUM COR SERPL-SCNC: (no result) MMOL/L (ref 136–145)
CALCIUM ALBUM COR SERPL-SCNC: 9.5 MG/DL (ref 8.5–10.1)
CALCIUM SERPL-MCNC: 8.6 MG/DL (ref 8.5–10.1)
CHLORIDE SERPL-SCNC: 102 MMOL/L (ref 98–107)
CO2 SERPL-SCNC: 32.1 MMOL/L (ref 21–32)
CREAT SERPL-MCNC: 1.45 MG/DL (ref 0.7–1.3)
EGFR  BLACK RACES: > 60 (ref 60–?)
EOSINOPHIL # BLD AUTO: 0.1 X10^3/UL (ref 0–0.2)
EOSINOPHIL NFR BLD AUTO: 0.4 % (ref 0.9–2.9)
ERYTHROCYTE [DISTWIDTH] IN BLOOD BY AUTOMATED COUNT: 14.2 % (ref 11.6–16.5)
HCT VFR BLD AUTO: 37.8 % (ref 42–54)
HGB BLD-MCNC: 12.6 G/DL (ref 13.5–18)
LYMPHOCYTES # BLD AUTO: 1.4 X10^3/UL (ref 1.3–2.9)
LYMPHOCYTES NFR BLD AUTO: 10.3 % (ref 21–51)
MCH RBC QN AUTO: 28.6 PG (ref 27–34)
MCHC RBC AUTO-ENTMCNC: 33.4 G/DL (ref 33–35)
MCV RBC AUTO: 85.6 FL (ref 80–100)
MONOCYTES # BLD AUTO: 1.1 X10^3/UL (ref 0.3–0.8)
MONOCYTES NFR BLD AUTO: 7.6 % (ref 0–13)
NEUTROPHILS # BLD AUTO: 11.3 X10^3/UL (ref 2.2–4.8)
NEUTROPHILS NFR BLD AUTO: 81.4 % (ref 42–75)
PLATELET # BLD: 241 X10^3/UL (ref 150–450)
PMV BLD AUTO: 8.4 FL (ref 7.4–11)
PROT SERPL-MCNC: 6.9 G/DL (ref 6.4–8.2)
RBC # BLD AUTO: 4.41 X10^6/UL (ref 4.7–6)
SODIUM SERPL-SCNC: 141 MMOL/L (ref 136–145)
WBC NRBC COR # BLD AUTO: 13.9 X10^3/UL (ref 3.6–10)

## 2017-09-18 RX ADMIN — IPRATROPIUM BROMIDE AND ALBUTEROL SULFATE SCH EA: .5; 3 SOLUTION RESPIRATORY (INHALATION) at 04:41

## 2017-09-18 RX ADMIN — SODIUM CHLORIDE SCH ML: 9 INJECTION, SOLUTION INTRAMUSCULAR; INTRAVENOUS; SUBCUTANEOUS at 05:59

## 2017-09-18 RX ADMIN — BUDESONIDE SCH EA: 0.5 INHALANT RESPIRATORY (INHALATION) at 09:43

## 2017-09-18 RX ADMIN — IPRATROPIUM BROMIDE AND ALBUTEROL SULFATE SCH EA: .5; 3 SOLUTION RESPIRATORY (INHALATION) at 09:43

## 2017-09-18 RX ADMIN — SODIUM CHLORIDE SCH: 9 INJECTION, SOLUTION INTRAMUSCULAR; INTRAVENOUS; SUBCUTANEOUS at 13:12

## 2017-09-18 RX ADMIN — SODIUM CHLORIDE SCH: 9 INJECTION, SOLUTION INTRAVENOUS at 10:19

## 2017-09-18 RX ADMIN — FUROSEMIDE SCH MG: 10 INJECTION, SOLUTION INTRAVENOUS at 08:37

## 2017-09-18 RX ADMIN — IPRATROPIUM BROMIDE AND ALBUTEROL SULFATE SCH EA: .5; 3 SOLUTION RESPIRATORY (INHALATION) at 14:00

## 2017-09-18 RX ADMIN — IPRATROPIUM BROMIDE AND ALBUTEROL SULFATE SCH EA: .5; 3 SOLUTION RESPIRATORY (INHALATION) at 16:39

## 2017-09-18 RX ADMIN — ALBUMIN (HUMAN) SCH MLS/HR: 0.25 INJECTION, SOLUTION INTRAVENOUS at 08:35

## 2017-09-18 RX ADMIN — IPRATROPIUM BROMIDE AND ALBUTEROL SULFATE SCH EA: .5; 3 SOLUTION RESPIRATORY (INHALATION) at 01:15

## 2017-09-18 RX ADMIN — MAGNESIUM SULFATE HEPTAHYDRATE SCH MLS/HR: 1 INJECTION, SOLUTION INTRAVENOUS at 08:55

## 2017-09-18 RX ADMIN — MAGNESIUM SULFATE HEPTAHYDRATE SCH MLS/HR: 1 INJECTION, SOLUTION INTRAVENOUS at 10:00

## 2017-09-18 RX ADMIN — METOPROLOL SUCCINATE SCH MG: 25 TABLET, EXTENDED RELEASE ORAL at 08:36

## 2017-09-18 NOTE — RAD
HISTORY:  Shortness of breath



Study: Portable AP chest



Comparison: Yesterday



Findings:



The trachea is midline.  The cardiac silhouette is mildly enlarged with a tortuous aorta..  The lungs
 are very well inflated and grossly clear. There is no pleural effusion or pneumothorax or vascular c
ongestion on today's exam..  The bony thorax is unremarkable.  



IMPRESSION: 

 

1.  Hyperinflation compatible with COPD



2. Mild cardiomegaly with a tortuous aorta. Resolution of vascular congestion.





Reported By:Electronically Signed by CHRISTOPHER HOOVER MD at 9/18/2017 8:34:34 AM

## 2017-09-18 NOTE — PCM.PROG
Progress Note





- Progress Note for Day of


Date: 09/17/17





- Subjective


Subjective:  WAS ADMITTED FOR CONGESTIVE HEART FAILURE. HE IS ALERT 

AND ORIENTED, LYING IN BED ON MORNING ROUNDS. HE IS NOTED WITH COMPLAINTS OF 

SHORTNESS OF BREATH AND COUGH. HE IS CURRENTLY ON NASAL CANNULA WITH OXYGEN AT 

2LPM. HE IS NOTED TO BE ON TELEMETRY. ATRIAL FLUTTER NOTED WITH HR  BMP. 

ON EXAMINATION, LUNGS ARE NOTED WITH WHEEZING BILATERALLY TO AUSCULTATION. 

ABDOMEN IS ROUND, SOFT, AND NON-TENDER. NORMAL BOWEL SOUNDS NOTED IN ALL 

QUADRANTS. VITAL SIGNS THIS MORNING ARE 98.1-106-21-97%193/89. CBC, CMP, ABG, 

AND CHEST XRAY WERE OBTAINED. ABNORMAL LAB VALUES INCLUDE THE FOLLOWING: WBC 

11.2, RBC 4.12, HGB 11.9, HCT 35.5, BUN 24, CREATININE 1.48, GLUCOSE 106, 

CALCIUM 8.4, MAGNESIUM 1.3, TOTAL PROTEIN 6.1, ALBUMIN 2.5. CHEST XRAY REPORTED 

NO SIGNIFICANT INTERVAL CHANGE, STABLE CARDIOMEGALY AND MILD VENOUS CONGESTIVE 

CHANGES. PATIENTS BLOOD PRESSURE REMAINS ELEVATED DESPITE RECENT CHANGES TO 

BLOOD PRESSURE MEDICATIONS. WE WILL START ALBUMIN 25% DAILY, NORVASC 10MG DAILY

, AND METOPROLOL XL 25MG DAILY. WE WILL GIVE LASIX 40MG IV Q12H X 2 DOSES. WE 

PLAN TO RECHECK CBC, CMP, AND CHEST XRAY IN THE MORNING AND CONTINUE TO MONITOR 

PATIENT.





- Past Medical Family Social History


Past Med/Fam/Surg Hx: No changes since H&P


Allergies: 


Allergies





lisinopril Allergy (Verified 09/16/17 06:21)


 











- Review of Systems


ROS: No change since H&P





- Vital Signs and I&O's


Vital Signs: 


 





Temperature                      99.3 F


Pulse Rate [Apical]              85


Pulse Rate                       90


Respiratory Rate                 18


Blood Pressure [Right Arm]       153/88


Blood Pressure [Left Arm]        158/86


Blood Pressure                   258/134


O2 Sat by Pulse Oximetry         98








Intake and Output: 


 Intake & Output











 09/15/17 09/16/17 09/17/17 09/18/17





 11:59 11:59 11:59 11:59


 


Intake Total   2468 2229


 


Output Total  1000 4920 4300


 


Balance  -0630 -3285 -4584














- Physical Exam


Oriented: Normal


Eyes: Normal


Ear: Normal


Nose: Normal


Throat: Normal


Respiratory: Wheezes


Cardiovascular: Tachycardia


: Normal


Auscultation: Bowel Sounds: Normal


Palpation: Normal


Tenderness: Normal


Skin: Normal


Musculoskeletal: Normal


Psychiatric: Normal


Mood Description: Calm


Affect: Normal


Speech Pattern: Clear, Appropriate





- Laboratory and Diagnostics


Result Diagrams: 


 09/18/17 03:20





 09/18/17 03:20


Labs: 


 Laboratory











WBC  13.9 X10^3/uL (3.6-10.0)  H  09/18/17  03:20    


 


RBC  4.41 X10^6/uL (4.7-6.0)  L  09/18/17  03:20    


 


Hgb  12.6 g/dL (13.5-18.0)  L  09/18/17  03:20    


 


Hct  37.8 % (42.0-54.0)  L  09/18/17  03:20    


 


MCV  85.6 fL (80.0-100.0)   09/18/17  03:20    


 


MCH  28.6 pg (27.0-34.0)   09/18/17  03:20    


 


MCHC  33.4 g/dL (33.0-35.0)   09/18/17  03:20    


 


RDW  14.2 % (11.6-16.5)   09/18/17  03:20    


 


Plt Count  241 X10^3/uL (150.0-450.0)   09/18/17  03:20    


 


MPV  8.4 fL (7.4-11.0)   09/18/17  03:20    


 


Neut %  81.4 % (42.0-75.0)  H  09/18/17  03:20    


 


Lymph %  10.3 % (21.0-51.0)  L  09/18/17  03:20    


 


Mono %  7.6 % (0.0-13.0)   09/18/17  03:20    


 


Eos %  0.4 % (0.9-2.9)  L  09/18/17  03:20    


 


Baso %  0.3 % (0.2-1.0)   09/18/17  03:20    


 


Neut #  11.3 x10^3/uL (2.2-4.8)  H  09/18/17  03:20    


 


Lymph #  1.4 X10^3/uL (1.3-2.9)   09/18/17  03:20    


 


Mono #  1.1 x10^3/uL (0.3-0.8)  H  09/18/17  03:20    


 


Eos #  0.1 x10^3/uL (0.0-0.2)   09/18/17  03:20    


 


Baso #  0.0 X10^3/uL (0.0-0.1)   09/18/17  03:20    


 


Absolute Nucleated RBC  0.0 /100WBC  09/18/17  03:20    


 


Sample Site  R rad   09/17/17  05:44    


 


ABG pH  7.450  (7.35-7.45)   09/17/17  05:44    


 


ABG pCO2  46.0 mmHg (35.0-45.0)  H  09/17/17  05:44    


 


ABG pO2  86.0 mmHg (80.0-100.0)   09/17/17  05:44    


 


ABG HCO3  32.0 mmol/L (22-26)  H*  09/17/17  05:44    


 


ABG O2 Saturation  97.0 % ()   09/17/17  05:44    


 


ABG Base Excess  7.0 mmol/L (-2.0-2.0)  H  09/17/17  05:44    


 


Eze Test  Pos   09/17/17  05:44    


 


A-a Gradient  6.0 mmHg  09/17/17  05:44    


 


FiO2  21.000   09/17/17  05:44    


 


Blood Gas Comments  [facility] well, af   09/17/17  05:44    


 


Sodium  141 mmol/L (136-145)   09/18/17  03:20    


 


Corrected Sodium  TNP   09/18/17  03:20    


 


Potassium  3.4 mmol/L (3.5-5.1)  L  09/18/17  03:20    


 


Chloride  102 mmol/L ()   09/18/17  03:20    


 


Carbon Dioxide  32.1 mmol/L (21-32)  H  09/18/17  03:20    


 


BUN  21 mg/dL (7-18)  H  09/18/17  03:20    


 


Creatinine  1.45 mg/dL (0.70-1.30)  H  09/18/17  03:20    


 


Est GFR (MDRD) Af Amer  > 60  (>60)   09/18/17  03:20    


 


Est GFR (MDRD) Non-Af  52  (>60)  L  09/18/17  03:20    


 


Glucose  101 mg/dL (65-99)  H  09/18/17  03:20    


 


Calcium  8.6 mg/dL (8.5-10.1)   09/18/17  03:20    


 


Corrected Calcium  9.5 mg/dL (8.5-10.1)   09/18/17  03:20    


 


Phosphorus  3.9 mg/dL (2.6-4.7)   09/16/17  04:15    


 


Magnesium  1.6 mg/dL (1.7-2.9)  L  09/18/17  03:20    


 


Total Bilirubin  0.70 mg/dL (0.2-1.0)   09/18/17  03:20    


 


AST  25 Units/L (15-37)   09/18/17  03:20    


 


ALT  27 Units/L (12-78)   09/18/17  03:20    


 


Alkaline Phosphatase  74 Units/L ()   09/18/17  03:20    


 


Creatine Kinase  272 Units/L ()   09/16/17  16:30    


 


CK-MB (CK-2)  4.6 ng/mL (0-4.0)  H*  09/16/17  16:30    


 


CK/CKMB % Calc  1.7 % (<4)   09/16/17  16:30    


 


Troponin I  0.12 ng/mL (0-1.5)   09/16/17  16:30    


 


B-Natriuretic Peptide  274 pg/mL (0-79)  H  09/16/17  04:15    


 


Total Protein  6.9 g/dL (6.4-8.2)   09/18/17  03:20    


 


Albumin  2.9 g/dL (3.4-5.0)  L  09/18/17  03:20    


 


Globulin  4.0 g/dL (2.5-4.5)   09/18/17  03:20    


 


Albumin/Globulin Ratio  0.7 Ratio (1.1-2.1)  L  09/18/17  03:20    


 


Specimen Type  Catherized urine   09/16/17  06:22    


 


Urine Color  Pale yellow  (YELLOW)   09/16/17  06:22    


 


Urine Appearance  Clear  (CLEAR)   09/16/17  06:22    


 


Urine pH  6.0  (5.0 - 8.0)   09/16/17  06:22    


 


Ur Specific Gravity  1.005  (1.000-1.030)   09/16/17  06:22    


 


Urine Protein  3+  (NEGATIVE)   09/16/17  06:22    


 


Urine Glucose (UA)  Negative  (NEGATIVE)   09/16/17  06:22    


 


Urine Ketones  Negative  (NEGATIVE)   09/16/17  06:22    


 


Urine Occult Blood  2+  (NEGATIVE)   09/16/17  06:22    


 


Urine Nitrite  Negative  (NEGATIVE)   09/16/17  06:22    


 


Urine Bilirubin  Negative  (NEGATIVE)   09/16/17  06:22    


 


Urine Urobilinogen  Normal  (NORMAL)   09/16/17  06:22    


 


Ur Leukocyte Esterase  Negative  (NEGATIVE)   09/16/17  06:22    


 


Urine RBC  04 - 08 /HPF (NEGATIVE)   09/16/17  06:22    


 


Urine WBC  01 - 04 /HPF (NEGATIVE)   09/16/17  06:22    


 


Ur Squamous Epith Cells  Rare /HPF (NEGATIVE)   09/16/17  06:22    


 


Amorphous Sediment  Trace /HPF (NEGATIVE)   09/16/17  06:22    


 


Urine Bacteria  Negative /HPF (NEGATIVE)   09/16/17  06:22    


 


Ur Culture Indicated?  No/not indicated   09/16/17  06:22    














- Plan


(1) Acute and chronic respiratory failure (acute-on-chronic)


Status: Acute   


Qualifiers: 


   Respiratory failure complication: hypoxia and hypercapnia   Qualified Code(s)

: J96.21 - Acute and chronic respiratory failure with hypoxia; J96.22 - Acute 

and chronic respiratory failure with hypercapnia   


Plan: DUONEB TX, PULMOCORT TX, LASIX 40MG IV BID, SUPPLEMENTAL OXYGEN, TELEMETRY

, CONTINUE TO MONITOR   





(2) CHF (congestive heart failure), NYHA class II


Status: Acute   


Qualifiers: 


   Congestive heart failure type: systolic   Congestive heart failure chronicity

: acute on chronic   Qualified Code(s): I50.23 - Acute on chronic systolic (

congestive) heart failure   


Plan: LASIX 40MG IV BID, DUONEBS[facility]   





(3) COPD (chronic obstructive pulmonary disease)


Status: Acute   


Qualifiers: 


   COPD type: unspecified COPD   Qualified Code(s): J44.9 - Chronic obstructive 

pulmonary disease, unspecified   


Plan: DUONEB TX, PULMOCORT TX, LASIX 40MG IV BID, SUPPLEMENTAL OXYGEN, TELEMETRY

, CONTINUE TO MONITOR   





(4) Hypertension


Status: Acute   


Qualifiers: 


   Hypertension type: essential hypertension   Qualified Code(s): I10 - 

Essential (primary) hypertension   


Plan: TOPROL 25MG DAILY, LOSARTAN 100MG HS, AMLODIPINE 10MG DAILY, LABETALOL 

PROTOCOL, CONTINUE TO MONITOR   





(5) Hypoalbuminemia


Status: Acute   





(6) Hypomagnesemia


Status: Acute   


Plan: MAGNESIUM SULFATE RIDERS, CONTINUE TO MONITOR LABS   





(7) Hypoalbuminemia


Status: Acute   


Plan: ALBUMIN 25% IV DAILY, CONTINUE TO MONITOR

## 2017-11-18 ENCOUNTER — HOSPITAL ENCOUNTER (INPATIENT)
Dept: HOSPITAL 24 - ER | Age: 62
LOS: 2 days | Discharge: HOME | DRG: 192 | End: 2017-11-20
Attending: FAMILY MEDICINE | Admitting: FAMILY MEDICINE
Payer: COMMERCIAL

## 2017-11-18 VITALS — BODY MASS INDEX: 25.8 KG/M2

## 2017-11-18 DIAGNOSIS — R06.89: ICD-10-CM

## 2017-11-18 DIAGNOSIS — R94.31: ICD-10-CM

## 2017-11-18 DIAGNOSIS — I50.9: ICD-10-CM

## 2017-11-18 DIAGNOSIS — J44.1: Primary | ICD-10-CM

## 2017-11-18 DIAGNOSIS — R06.02: ICD-10-CM

## 2017-11-18 DIAGNOSIS — E83.42: ICD-10-CM

## 2017-11-18 DIAGNOSIS — I51.7: ICD-10-CM

## 2017-11-18 DIAGNOSIS — I10: ICD-10-CM

## 2017-11-18 LAB
ALBUMIN SERPL BCP-MCNC: 3 G/DL (ref 3.4–5)
ALP SERPL-CCNC: 105 UNITS/L (ref 46–116)
ALT SERPL W P-5'-P-CCNC: 29 UNITS/L (ref 12–78)
ARTERIAL PATENCY WRIST A: (no result)
ARTERIAL PATENCY WRIST A: (no result)
AST SERPL W P-5'-P-CCNC: 42 UNITS/L (ref 15–37)
BASE EXCESS BLDA CALC-SCNC: -0.6 MMOL/L (ref -2–2)
BASE EXCESS BLDA CALC-SCNC: -1.9 MMOL/L (ref -2–2)
BASOPHILS # BLD AUTO: 0.1 X10^3/UL (ref 0–0.1)
BASOPHILS NFR BLD AUTO: 1.1 % (ref 0.2–1)
BUN SERPL-MCNC: 14 MG/DL (ref 7–18)
CALCIUM ALBUM COR SERPL-SCNC: (no result) MMOL/L (ref 136–145)
CALCIUM ALBUM COR SERPL-SCNC: 9.5 MG/DL (ref 8.5–10.1)
CALCIUM SERPL-MCNC: 8.7 MG/DL (ref 8.5–10.1)
CHLORIDE SERPL-SCNC: 107 MMOL/L (ref 98–107)
CK MB SERPL-MCNC: 3.7 NG/ML (ref 0–4)
CK MB SERPL-MCNC: 4.5 NG/ML (ref 0–4)
CK MB SERPL-MCNC: 5.4 NG/ML (ref 0–4)
CK SERPL-CCNC: 161 UNITS/L (ref 39–308)
CK SERPL-CCNC: 172 UNITS/L (ref 39–308)
CK SERPL-CCNC: 205 UNITS/L (ref 39–308)
CKMB %: 2.3 % (ref ?–4)
CKMB %: 2.6 % (ref ?–4)
CKMB %: 2.6 % (ref ?–4)
CO2 SERPL-SCNC: 28.9 MMOL/L (ref 21–32)
CREAT SERPL-MCNC: 1.3 MG/DL (ref 0.7–1.3)
EGFR  BLACK RACES: > 60 (ref 60–?)
EOSINOPHIL # BLD AUTO: 0.2 X10^3/UL (ref 0–0.2)
EOSINOPHIL NFR BLD AUTO: 3.7 % (ref 0.9–2.9)
ERYTHROCYTE [DISTWIDTH] IN BLOOD BY AUTOMATED COUNT: 14.9 % (ref 11.6–16.5)
FRACTIONATED INSPIRED OXYGEN: 30
FRACTIONATED INSPIRED OXYGEN: 36
HCO3 BLDA-SCNC: 26.6 MMOL/L (ref 22–26)
HCO3 BLDA-SCNC: 26.8 MMOL/L (ref 22–26)
HCT VFR BLD AUTO: 39.3 % (ref 42–54)
HGB BLD-MCNC: 13.2 G/DL (ref 13.5–18)
LYMPHOCYTES # BLD AUTO: 2.2 X10^3/UL (ref 1.3–2.9)
LYMPHOCYTES NFR BLD AUTO: 34.1 % (ref 21–51)
MAGNESIUM SERPL-MCNC: 1.5 MG/DL (ref 1.7–2.9)
MCH RBC QN AUTO: 28.7 PG (ref 27–34)
MCHC RBC AUTO-ENTMCNC: 33.5 G/DL (ref 33–35)
MCV RBC AUTO: 85.7 FL (ref 80–100)
MONOCYTES # BLD AUTO: 0.7 X10^3/UL (ref 0.3–0.8)
MONOCYTES NFR BLD AUTO: 10.4 % (ref 0–13)
NEUTROPHILS # BLD AUTO: 3.3 X10^3/UL (ref 2.2–4.8)
NEUTROPHILS NFR BLD AUTO: 50.7 % (ref 42–75)
PCO2 BLDA: 54 MMHG (ref 35–45)
PCO2 BLDA: 64 MMHG (ref 35–45)
PH BLDA: 7.23 [PH] (ref 7.35–7.45)
PH BLDA: 7.3 [PH] (ref 7.35–7.45)
PLATELET # BLD: 284 X10^3/UL (ref 150–450)
PMV BLD AUTO: 7.9 FL (ref 7.4–11)
PO2 BLDA: 67 MMHG (ref 80–100)
PO2 BLDA: 83 MMHG (ref 80–100)
PROT SERPL-MCNC: 7.5 G/DL (ref 6.4–8.2)
RBC # BLD AUTO: 4.59 X10^6/UL (ref 4.7–6)
SAO2 % BLDA: 89 % (ref 90–100)
SAO2 % BLDA: 95 % (ref 90–100)
SODIUM SERPL-SCNC: 142 MMOL/L (ref 136–145)
TROPONIN I SERPL-MCNC: 0.09 NG/ML (ref 0–1.5)
WBC NRBC COR # BLD AUTO: 6.5 X10^3/UL (ref 3.6–10)

## 2017-11-18 PROCEDURE — 93005 ELECTROCARDIOGRAM TRACING: CPT

## 2017-11-18 PROCEDURE — 94660 CPAP INITIATION&MGMT: CPT

## 2017-11-18 PROCEDURE — 96365 THER/PROPH/DIAG IV INF INIT: CPT

## 2017-11-18 PROCEDURE — 85025 COMPLETE CBC W/AUTO DIFF WBC: CPT

## 2017-11-18 PROCEDURE — 82553 CREATINE MB FRACTION: CPT

## 2017-11-18 PROCEDURE — 96367 TX/PROPH/DG ADDL SEQ IV INF: CPT

## 2017-11-18 PROCEDURE — 85730 THROMBOPLASTIN TIME PARTIAL: CPT

## 2017-11-18 PROCEDURE — 85378 FIBRIN DEGRADE SEMIQUANT: CPT

## 2017-11-18 PROCEDURE — 82550 ASSAY OF CK (CPK): CPT

## 2017-11-18 PROCEDURE — 71275 CT ANGIOGRAPHY CHEST: CPT

## 2017-11-18 PROCEDURE — 80053 COMPREHEN METABOLIC PANEL: CPT

## 2017-11-18 PROCEDURE — 71010: CPT

## 2017-11-18 PROCEDURE — 96374 THER/PROPH/DIAG INJ IV PUSH: CPT

## 2017-11-18 PROCEDURE — 36600 WITHDRAWAL OF ARTERIAL BLOOD: CPT

## 2017-11-18 PROCEDURE — 94640 AIRWAY INHALATION TREATMENT: CPT

## 2017-11-18 PROCEDURE — 99284 EMERGENCY DEPT VISIT MOD MDM: CPT

## 2017-11-18 PROCEDURE — 84484 ASSAY OF TROPONIN QUANT: CPT

## 2017-11-18 PROCEDURE — 96375 TX/PRO/DX INJ NEW DRUG ADDON: CPT

## 2017-11-18 PROCEDURE — 99285 EMERGENCY DEPT VISIT HI MDM: CPT

## 2017-11-18 PROCEDURE — 83735 ASSAY OF MAGNESIUM: CPT

## 2017-11-18 PROCEDURE — 36415 COLL VENOUS BLD VENIPUNCTURE: CPT

## 2017-11-18 PROCEDURE — 82803 BLOOD GASES ANY COMBINATION: CPT

## 2017-11-18 PROCEDURE — 93010 ELECTROCARDIOGRAM REPORT: CPT

## 2017-11-18 PROCEDURE — 85610 PROTHROMBIN TIME: CPT

## 2017-11-18 RX ADMIN — TAMSULOSIN HYDROCHLORIDE SCH MG: 0.4 CAPSULE ORAL at 12:02

## 2017-11-18 RX ADMIN — BUDESONIDE SCH EA: 0.5 INHALANT RESPIRATORY (INHALATION) at 21:17

## 2017-11-18 RX ADMIN — SODIUM CHLORIDE SCH MLS/HR: 9 INJECTION, SOLUTION INTRAVENOUS at 23:44

## 2017-11-18 RX ADMIN — LOSARTAN POTASSIUM SCH MG: 50 TABLET, FILM COATED ORAL at 20:30

## 2017-11-18 RX ADMIN — IPRATROPIUM BROMIDE AND ALBUTEROL SULFATE SCH EA: .5; 3 SOLUTION RESPIRATORY (INHALATION) at 16:54

## 2017-11-18 RX ADMIN — IPRATROPIUM BROMIDE AND ALBUTEROL SULFATE SCH EA: .5; 3 SOLUTION RESPIRATORY (INHALATION) at 12:20

## 2017-11-18 RX ADMIN — AMLODIPINE BESYLATE SCH MG: 10 TABLET ORAL at 12:03

## 2017-11-18 RX ADMIN — SODIUM CHLORIDE SCH MLS/HR: 9 INJECTION, SOLUTION INTRAVENOUS at 03:20

## 2017-11-18 RX ADMIN — IPRATROPIUM BROMIDE AND ALBUTEROL SULFATE SCH EA: .5; 3 SOLUTION RESPIRATORY (INHALATION) at 21:16

## 2017-11-18 RX ADMIN — ASPIRIN 325 MG ORAL TABLET SCH MG: 325 PILL ORAL at 12:03

## 2017-11-18 RX ADMIN — SODIUM CHLORIDE SCH MLS/HR: 9 INJECTION, SOLUTION INTRAVENOUS at 15:33

## 2017-11-18 NOTE — RAD
Chest, one view



Indication: Difficulty breathing



Comparison: 09/18/2017



Findings: There is stable cardiomegaly without congestive failure. The lungs are again hyperinflated 
with coarsened interstitial opacities, suggestive for COPD. No focal consolidation, significant effus
ion or pneumothorax is identified. Osseous thorax is unremarkable.



Impression:  

Stable cardiomegaly and COPD changes without acute cardiopulmonary abnormality.



















Reported By:Electronically Signed by JOSE FRANKLIN MD at 11/18/2017 4:05:47 AM

## 2017-11-18 NOTE — CT
CTA chest



Indication: Difficulty breathing



Technique: Helical CT images of the chest were obtained with IV contrast. Reformatted images in the c
oronal and sagittal planes and 3D MIP images were also generated for review.



Comparison: CT chest 08/12/2016



Findings: Portions of the left upper lobe and the majority of the left lower lobe are excluded from t
he field-of-view. The exam is also moderately degraded by patient motion artifact. Given these limita
tions, contrast bolus timing is adequate for detection of PTE. No central or right-sided pulmonary ar
terial filling defects are identified. No pulmonary emboli are identified within the visualized porti
ons of the left lung. There is no pulmonary arterial dilatation or evidence of right heart strain. Th
e heart is mildly enlarged without pericardial effusion. The thoracic aorta is mildly calcified witho
ut aneurysm. The central airways are patent. There is no lymphadenopathy.



There is moderate upper lobe predominant centrilobular emphysema. The visualized lungs are otherwise 
clear without focal consolidation or incidental nodule/mass. No significant right pleural effusion or
 pneumothorax is identified.



Limited arterial phase images of the upper abdomen demonstrate no acute abnormality. There is moderat
e passive contrast reflux into the hepatic veins. Apparent cortical discontinuity of the inferior rig
ht scapular body is felt to be related to motion artifact. No acute or aggressive osseous abnormality
 is identified.



Impression: 

Limited exam due to motion artifact and technique, as detailed above. 

No PTE identified within the visualized lungs.

Emphysema without visualized acute chest process.

Cardiomegaly without congestive failure.











Reported By:Electronically Signed by JOSE FRANKLIN MD at 11/18/2017 5:39:47 AM

## 2017-11-18 NOTE — DR.GENAD
HPI





- PCP


Primary Care Physician: EILEEN





- Complaint/Symptoms


Chief Complaint Doctors Comments: Patient presented to the ED stating that he 

can not breath.  He has a history of COPD and hypertension, denies chest pain. 

He reports that he has not been to a doctor in a long time. He was admitted on 9 /16/17 for COPD.  His EKG today  shows acute inferior infarct with RBBB 

troponins are normal. D Dimer of 1000; CTA (limited due to motion) shows no PTE 

within the visualize lungs, emphysema without visualized acute chest process, 

cardiomegaly without congestive failure.  complaint. He smokes a pack to a pack 

and half per day and drinks occassionally.


Chief Complaint:: " WAS ASLEEP WOKE UP HAVING TROUBLE BREATHING"





- Source


History Provided: Patient





- Mode of Arrival


Mode of Arrival: Wheelchair





- Timing


Onset of Chief Complaint: 11/18/17





PMH





- PMH


Past Medical History: Yes


Past Medical History: Arthritis, Asthma, CHF, COPD, CVA, Hypertension, PUD


Past Surgical History: Yes


Surgical History: Unknown





- Family History


History of Family Medical Conditions: Yes


Family Medical History: Hypertension





- Social History


Alcohol Use: Occasionally


Do you use any recreational Drugs:: No


Lives With: Family


Lives Where: Home





- infectious screening


Have you traveled outside the country in the last 6 months?: No





ROS





- Review of Systems


Eyes: No Symptoms Reported


ENTM: No Symptoms Reported


Respiratoy: No Symptoms Reported


Cardiovascular: No Symptoms Reported


Gastrointestinal/Abdominal: No Symptoms Reported


Genitourinary: No Symptoms Reported


Neurological: No Symptoms Reported


Musculoskeletal: No Symptoms Reported


Integumentary: No Symptoms Reported


Hematologic/Lymphatic: No Symptoms Reported


Endocrine: No Symptoms Reported


Psychiatric: No Symptoms Reported


All Other Systems: Reviewed and Negative





PE





- Vital Signs


Vitals: 


 





Temperature                      97.5 F


Pulse Rate [Brachial]            89


Pulse Rate                       95


Respiratory Rate                 22


Blood Pressure [Right Arm]       176/89


Blood Pressure [Left Arm]        203/111


Blood Pressure                   223/121


O2 Sat by Pulse Oximetry         95











- General


General Appearance: Anxious





- Head


Head Exam: Normal Inspection, Atraumatic





- Eyes


Eye exam: Normal Appearance, PERRL, EOMI





- ENT


ENT Exam: Normal Exam


External Ear Exam: Normal External Inspection


TM/Canal Exam: Bilateral Normal


Nose Exam: Normal Nose Exam


Mouth Exam: Normal Inspection


Throat Exam: Normal Inspection





- Neck


Neck Exam: Normal Inspection





- Chest


Chest Inspection: Normal Inspection





- Respiratory


Respiratory Exam: Normal Lung Sounds Bilat


Respiratory Exam: Bilateral Clear to Auscultation





- Cardiovascular


Cardiovascular Exam: Regular Rate, Normal Rhythm





- Abdominal Exam


Abdominal Exam: Normal Inspection


Abdominal Tenderness: negative: RUQ, RLQ, LUQ, LLQ, Epigastrium, Suprapubic, 

Diffuse, Mild, Moderate, Severe, Other





- Extremities


Extremities Exam: Normal Inspection, Full ROM





- Back


Back Exam: Normal Inspection, Full ROM





- Neurologic


Neurological Exam: Alert, Oriented X3, CN II-XII Intact





- Psychiatric


Psychiatric Exam: Normal Affect, Anxious





- Skin


Skin Exam: Warm, Dry, Intact





Course





- Treatment


Treatment: see orders





- Reevaluation


1st: Improved





- Consultation


Called: 05:35 (Dr Jarrett recommended repeat cardiacs in 4hrs.)


Call Returned: 05:50 (accepted for transfer)





ROR





- Labs Reviewed


Laboratory Results Reviewed?: Yes (D Dimer 1000)


Result Diagrams: 


 11/18/17 03:04





 11/18/17 03:04


Laboratory: 


 











WBC  6.5 X10^3/uL (3.6-10.0)   11/18/17  03:04    


 


RBC  4.59 X10^6/uL (4.7-6.0)  L  11/18/17  03:04    


 


Hgb  13.2 g/dL (13.5-18.0)  L  11/18/17  03:04    


 


Hct  39.3 % (42.0-54.0)  L  11/18/17  03:04    


 


MCV  85.7 fL (80.0-100.0)   11/18/17  03:04    


 


MCH  28.7 pg (27.0-34.0)   11/18/17  03:04    


 


MCHC  33.5 g/dL (33.0-35.0)   11/18/17  03:04    


 


RDW  14.9 % (11.6-16.5)   11/18/17  03:04    


 


Plt Count  284 X10^3/uL (150.0-450.0)   11/18/17  03:04    


 


MPV  7.9 fL (7.4-11.0)   11/18/17  03:04    


 


Neut %  50.7 % (42.0-75.0)   11/18/17  03:04    


 


Lymph %  34.1 % (21.0-51.0)   11/18/17  03:04    


 


Mono %  10.4 % (0.0-13.0)   11/18/17  03:04    


 


Eos %  3.7 % (0.9-2.9)  H  11/18/17  03:04    


 


Baso %  1.1 % (0.2-1.0)  H  11/18/17  03:04    


 


Neut #  3.3 x10^3/uL (2.2-4.8)   11/18/17  03:04    


 


Lymph #  2.2 X10^3/uL (1.3-2.9)   11/18/17  03:04    


 


Mono #  0.7 x10^3/uL (0.3-0.8)   11/18/17  03:04    


 


Eos #  0.2 x10^3/uL (0.0-0.2)   11/18/17  03:04    


 


Baso #  0.1 X10^3/uL (0.0-0.1)   11/18/17  03:04    


 


Absolute Nucleated RBC  0.0 /100WBC  11/18/17  03:04    


 


INR Target Range  -   11/18/17  03:04    


 


INR  0.92  (0.8-1.3)   11/18/17  03:04    


 


PTT  27.0 SECONDS (22.9-36.5)   11/18/17  03:04    


 


PTT Comment  -   11/18/17  03:04    


 


D-Dimer  1000 ng/mL (0-400)  H*  11/18/17  03:04    


 


Sample Site  Rr   11/18/17  06:30    


 


ABG pH  7.300  (7.35-7.45)  L  11/18/17  06:30    


 


ABG pCO2  54.0 mmHg (35.0-45.0)  H*  11/18/17  06:30    


 


ABG pO2  83.0 mmHg (80.0-100.0)   11/18/17  06:30    


 


ABG HCO3  26.6 mmol/L (22-26)  H  11/18/17  06:30    


 


ABG O2 Saturation  95.0 % ()   11/18/17  06:30    


 


ABG Base Excess  -0.6 mmol/L (-2.0-2.0)   11/18/17  06:30    


 


Eze Test  Pos   11/18/17  06:30    


 


A-a Gradient  63.0 mmHg  11/18/17  06:30    


 


FiO2  30   11/18/17  06:30    


 


Blood Gas Comments  Yaima well   11/18/17  06:30    


 


Sodium  142 mmol/L (136-145)   11/18/17  03:04    


 


Corrected Sodium  TNP   11/18/17  03:04    


 


Potassium  4.2 mmol/L (3.5-5.1)   11/18/17  03:04    


 


Chloride  107 mmol/L ()   11/18/17  03:04    


 


Carbon Dioxide  28.9 mmol/L (21-32)   11/18/17  03:04    


 


BUN  14 mg/dL (7-18)   11/18/17  03:04    


 


Creatinine  1.30 mg/dL (0.70-1.30)   11/18/17  03:04    


 


Est GFR (MDRD) Af Amer  > 60  (>60)   11/18/17  03:04    


 


Est GFR (MDRD) Non-Af  59  (>60)   11/18/17  03:04    


 


Glucose  106 mg/dL (65-99)  H  11/18/17  03:04    


 


Calcium  8.7 mg/dL (8.5-10.1)   11/18/17  03:04    


 


Corrected Calcium  9.5 mg/dL (8.5-10.1)   11/18/17  03:04    


 


Magnesium  1.5 mg/dL (1.7-2.9)  L  11/18/17  03:04    


 


Total Bilirubin  0.40 mg/dL (0.2-1.0)   11/18/17  03:04    


 


AST  42 Units/L (15-37)  H  11/18/17  03:04    


 


ALT  29 Units/L (12-78)   11/18/17  03:04    


 


Alkaline Phosphatase  105 Units/L ()   11/18/17  03:04    


 


Creatine Kinase  161 Units/L ()   11/18/17  03:04    


 


CK-MB (CK-2)  3.7 ng/mL (0-4.0)   11/18/17  03:04    


 


CK/CKMB % Calc  2.3 % (<4)   11/18/17  03:04    


 


Troponin I  0.09 ng/mL (0-1.5)   11/18/17  03:04    


 


Total Protein  7.5 g/dL (6.4-8.2)   11/18/17  03:04    


 


Albumin  3.0 g/dL (3.4-5.0)  L  11/18/17  03:04    


 


Globulin  4.5 g/dL (2.5-4.5)   11/18/17  03:04    


 


Albumin/Globulin Ratio  0.7 Ratio (1.1-2.1)  L  11/18/17  03:04    














- XRAY


XRAY Interpreted by: Radiologist (Chest: Ther is stable cardiomegaly without 

congestive failure.  The lungs are again hyperinflated with coarsed 

interstitial opacities, suggestive for COPD.  No focal consolidation, 

significant effusion or pneumothorax is identified.  Osseous thorax is 

unremarkable. Impression: Stable cardiomegaly and COPD changes without acute 

cardiopulmonary abnormality.)





- EKG


Rate: 123


Block: RBBB


Hypertrophy: LVH


ST: Inf, Infarct





- Diagnosis


Discharge Problem: 


 COPD exacerbation, Hypomagnesemia





Hypertension


Qualifiers:


 Hypertension type: essential hypertension Qualified Code(s): I10 - Essential (

primary) hypertension








- Discharge Plan


Condition: Stable





- Follow ups/Referrals


Follow ups/Referrals: 


MARISOL ARELLANO [Primary Care Provider] - 3 days





- Instructions

## 2017-11-19 LAB
ALBUMIN SERPL BCP-MCNC: 2.6 G/DL (ref 3.4–5)
ALP SERPL-CCNC: 120 UNITS/L (ref 46–116)
ALT SERPL W P-5'-P-CCNC: 45 UNITS/L (ref 12–78)
ARTERIAL PATENCY WRIST A: (no result)
AST SERPL W P-5'-P-CCNC: 42 UNITS/L (ref 15–37)
BASE EXCESS BLDA CALC-SCNC: -1.1 MMOL/L (ref -2–2)
BASOPHILS # BLD AUTO: 0 X10^3/UL (ref 0–0.1)
BASOPHILS NFR BLD AUTO: 0.2 % (ref 0.2–1)
BUN SERPL-MCNC: 23 MG/DL (ref 7–18)
CALCIUM ALBUM COR SERPL-SCNC: 142 MMOL/L (ref 136–145)
CALCIUM ALBUM COR SERPL-SCNC: 9.6 MG/DL (ref 8.5–10.1)
CALCIUM SERPL-MCNC: 8.5 MG/DL (ref 8.5–10.1)
CHLORIDE SERPL-SCNC: 108 MMOL/L (ref 98–107)
CK MB SERPL-MCNC: 3.6 NG/ML (ref 0–4)
CK MB SERPL-MCNC: 4.8 NG/ML (ref 0–4)
CK SERPL-CCNC: 150 UNITS/L (ref 39–308)
CK SERPL-CCNC: 161 UNITS/L (ref 39–308)
CKMB %: 2.4 % (ref ?–4)
CKMB %: 3 % (ref ?–4)
CO2 SERPL-SCNC: 25.3 MMOL/L (ref 21–32)
CREAT SERPL-MCNC: 1.44 MG/DL (ref 0.7–1.3)
EGFR  BLACK RACES: > 60 (ref 60–?)
EOSINOPHIL # BLD AUTO: 0 X10^3/UL (ref 0–0.2)
EOSINOPHIL NFR BLD AUTO: 0 % (ref 0.9–2.9)
ERYTHROCYTE [DISTWIDTH] IN BLOOD BY AUTOMATED COUNT: 15.2 % (ref 11.6–16.5)
FRACTIONATED INSPIRED OXYGEN: 32
HCO3 BLDA-SCNC: 25.3 MMOL/L (ref 22–26)
HCT VFR BLD AUTO: 36.9 % (ref 42–54)
HGB BLD-MCNC: 12.2 G/DL (ref 13.5–18)
LYMPHOCYTES # BLD AUTO: 0.8 X10^3/UL (ref 1.3–2.9)
LYMPHOCYTES NFR BLD AUTO: 6.4 % (ref 21–51)
MAGNESIUM SERPL-MCNC: 1.5 MG/DL (ref 1.7–2.9)
MCH RBC QN AUTO: 28.4 PG (ref 27–34)
MCHC RBC AUTO-ENTMCNC: 33 G/DL (ref 33–35)
MCV RBC AUTO: 86 FL (ref 80–100)
MONOCYTES # BLD AUTO: 0.8 X10^3/UL (ref 0.3–0.8)
MONOCYTES NFR BLD AUTO: 6 % (ref 0–13)
NEUTROPHILS # BLD AUTO: 11.5 X10^3/UL (ref 2.2–4.8)
NEUTROPHILS NFR BLD AUTO: 87.4 % (ref 42–75)
PCO2 BLDA: 48 MMHG (ref 35–45)
PH BLDA: 7.33 [PH] (ref 7.35–7.45)
PLATELET # BLD: 271 X10^3/UL (ref 150–450)
PMV BLD AUTO: 8.2 FL (ref 7.4–11)
PO2 BLDA: 93 MMHG (ref 80–100)
PROT SERPL-MCNC: 6.8 G/DL (ref 6.4–8.2)
RBC # BLD AUTO: 4.29 X10^6/UL (ref 4.7–6)
SAO2 % BLDA: 97 % (ref 90–100)
SODIUM SERPL-SCNC: 142 MMOL/L (ref 136–145)
TROPONIN I SERPL-MCNC: 0.05 NG/ML (ref 0–1.5)
TROPONIN I SERPL-MCNC: 0.06 NG/ML (ref 0–1.5)
WBC NRBC COR # BLD AUTO: 13.1 X10^3/UL (ref 3.6–10)

## 2017-11-19 RX ADMIN — SODIUM CHLORIDE SCH MLS/HR: 9 INJECTION, SOLUTION INTRAVENOUS at 06:25

## 2017-11-19 RX ADMIN — BUDESONIDE SCH EA: 0.5 INHALANT RESPIRATORY (INHALATION) at 20:45

## 2017-11-19 RX ADMIN — IPRATROPIUM BROMIDE AND ALBUTEROL SULFATE SCH EA: .5; 3 SOLUTION RESPIRATORY (INHALATION) at 12:15

## 2017-11-19 RX ADMIN — Medication PRN MG: at 06:57

## 2017-11-19 RX ADMIN — AMLODIPINE BESYLATE SCH MG: 10 TABLET ORAL at 09:43

## 2017-11-19 RX ADMIN — ASPIRIN 325 MG ORAL TABLET SCH MG: 325 PILL ORAL at 09:43

## 2017-11-19 RX ADMIN — IPRATROPIUM BROMIDE AND ALBUTEROL SULFATE SCH EA: .5; 3 SOLUTION RESPIRATORY (INHALATION) at 04:32

## 2017-11-19 RX ADMIN — LOSARTAN POTASSIUM SCH MG: 50 TABLET, FILM COATED ORAL at 21:04

## 2017-11-19 RX ADMIN — METOPROLOL SUCCINATE SCH MG: 25 TABLET, EXTENDED RELEASE ORAL at 09:44

## 2017-11-19 RX ADMIN — TRIAMTERENE AND HYDROCHLOROTHIAZIDE SCH CAP: 37.5; 25 TABLET ORAL at 09:44

## 2017-11-19 RX ADMIN — IPRATROPIUM BROMIDE AND ALBUTEROL SULFATE SCH EA: .5; 3 SOLUTION RESPIRATORY (INHALATION) at 00:28

## 2017-11-19 RX ADMIN — IPRATROPIUM BROMIDE AND ALBUTEROL SULFATE SCH EA: .5; 3 SOLUTION RESPIRATORY (INHALATION) at 09:45

## 2017-11-19 RX ADMIN — BUDESONIDE SCH EA: 0.5 INHALANT RESPIRATORY (INHALATION) at 09:45

## 2017-11-19 RX ADMIN — TAMSULOSIN HYDROCHLORIDE SCH MG: 0.4 CAPSULE ORAL at 09:44

## 2017-11-19 RX ADMIN — SODIUM CHLORIDE SCH MLS/HR: 9 INJECTION, SOLUTION INTRAVENOUS at 13:47

## 2017-11-19 RX ADMIN — IPRATROPIUM BROMIDE AND ALBUTEROL SULFATE SCH EA: .5; 3 SOLUTION RESPIRATORY (INHALATION) at 20:45

## 2017-11-19 RX ADMIN — SODIUM CHLORIDE SCH MLS/HR: 9 INJECTION, SOLUTION INTRAVENOUS at 23:07

## 2017-11-19 RX ADMIN — IPRATROPIUM BROMIDE AND ALBUTEROL SULFATE SCH EA: .5; 3 SOLUTION RESPIRATORY (INHALATION) at 16:54

## 2017-11-20 VITALS — DIASTOLIC BLOOD PRESSURE: 89 MMHG | SYSTOLIC BLOOD PRESSURE: 133 MMHG

## 2017-11-20 RX ADMIN — IPRATROPIUM BROMIDE AND ALBUTEROL SULFATE SCH EA: .5; 3 SOLUTION RESPIRATORY (INHALATION) at 01:03

## 2017-11-20 RX ADMIN — DIAZEPAM PRN MG: 5 TABLET ORAL at 08:17

## 2017-11-20 RX ADMIN — TRIAMTERENE AND HYDROCHLOROTHIAZIDE SCH CAP: 37.5; 25 TABLET ORAL at 08:16

## 2017-11-20 RX ADMIN — IPRATROPIUM BROMIDE AND ALBUTEROL SULFATE SCH EA: .5; 3 SOLUTION RESPIRATORY (INHALATION) at 12:01

## 2017-11-20 RX ADMIN — Medication PRN MG: at 06:20

## 2017-11-20 RX ADMIN — IPRATROPIUM BROMIDE AND ALBUTEROL SULFATE SCH EA: .5; 3 SOLUTION RESPIRATORY (INHALATION) at 08:12

## 2017-11-20 RX ADMIN — TAMSULOSIN HYDROCHLORIDE SCH MG: 0.4 CAPSULE ORAL at 08:17

## 2017-11-20 RX ADMIN — ASPIRIN 325 MG ORAL TABLET SCH MG: 325 PILL ORAL at 08:17

## 2017-11-20 RX ADMIN — IPRATROPIUM BROMIDE AND ALBUTEROL SULFATE SCH EA: .5; 3 SOLUTION RESPIRATORY (INHALATION) at 05:03

## 2017-11-20 RX ADMIN — AMLODIPINE BESYLATE SCH MG: 10 TABLET ORAL at 08:16

## 2017-11-20 RX ADMIN — DIAZEPAM PRN MG: 5 TABLET ORAL at 00:33

## 2017-11-20 RX ADMIN — METOPROLOL SUCCINATE SCH MG: 25 TABLET, EXTENDED RELEASE ORAL at 08:16

## 2017-11-20 RX ADMIN — SODIUM CHLORIDE SCH: 9 INJECTION, SOLUTION INTRAVENOUS at 06:00

## 2018-05-23 ENCOUNTER — HOSPITAL ENCOUNTER (INPATIENT)
Dept: HOSPITAL 24 - ER | Age: 63
LOS: 2 days | Discharge: ANOTHER HOSPITAL | DRG: 208 | End: 2018-05-25
Attending: INTERNAL MEDICINE | Admitting: INTERNAL MEDICINE
Payer: COMMERCIAL

## 2018-05-23 VITALS — BODY MASS INDEX: 20.8 KG/M2

## 2018-05-23 DIAGNOSIS — I50.9: ICD-10-CM

## 2018-05-23 DIAGNOSIS — I16.0: ICD-10-CM

## 2018-05-23 DIAGNOSIS — I63.8: ICD-10-CM

## 2018-05-23 DIAGNOSIS — J96.20: Primary | ICD-10-CM
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
## 2018-05-23 DIAGNOSIS — J44.1: ICD-10-CM

## 2018-05-23 DIAGNOSIS — S93.492A: ICD-10-CM

## 2018-05-23 DIAGNOSIS — R26.89: ICD-10-CM

## 2018-05-23 DIAGNOSIS — J20.8: ICD-10-CM

## 2018-05-23 DIAGNOSIS — I10: ICD-10-CM

## 2018-05-23 LAB
ALBUMIN SERPL BCP-MCNC: 3.1 G/DL (ref 3.4–5)
ALP SERPL-CCNC: 82 UNITS/L (ref 46–116)
ALT SERPL W P-5'-P-CCNC: 21 UNITS/L (ref 12–78)
AMORPH SED URNS QL MICRO: (no result) /HPF
ARTERIAL PATENCY WRIST A: (no result)
ARTERIAL PATENCY WRIST A: (no result)
AST SERPL W P-5'-P-CCNC: 31 UNITS/L (ref 15–37)
BACTERIA URNS QL MICRO: (no result) /HPF
BASE EXCESS BLDA CALC-SCNC: -1.4 MMOL/L (ref -2–2)
BASE EXCESS BLDA CALC-SCNC: -2 MMOL/L (ref -2–2)
BASOPHILS # BLD AUTO: 0.1 X10^3/UL (ref 0–0.1)
BASOPHILS NFR BLD AUTO: 0.8 % (ref 0.2–1)
BILIRUB UR QL STRIP.AUTO: NEGATIVE
BUN SERPL-MCNC: 15 MG/DL (ref 7–18)
CALCIUM ALBUM COR SERPL-SCNC: (no result) MMOL/L (ref 136–145)
CALCIUM ALBUM COR SERPL-SCNC: 8.4 MG/DL (ref 8.5–10.1)
CALCIUM SERPL-MCNC: 7.7 MG/DL (ref 8.5–10.1)
CHLORIDE SERPL-SCNC: 109 MMOL/L (ref 98–107)
CK MB SERPL-MCNC: 4.8 NG/ML (ref 0–4)
CK MB SERPL-MCNC: 5.3 NG/ML (ref 0–4)
CK SERPL-CCNC: 176 UNITS/L (ref 39–308)
CK SERPL-CCNC: 184 UNITS/L (ref 39–308)
CKMB %: 2.6 % (ref ?–4)
CKMB %: 3 % (ref ?–4)
CLARITY UR: CLEAR
CO2 SERPL-SCNC: 28.7 MMOL/L (ref 21–32)
COLOR UR AUTO: YELLOW
CREAT SERPL-MCNC: 1.45 MG/DL (ref 0.7–1.3)
EGFR  BLACK RACES: > 60 (ref 60–?)
EOSINOPHIL # BLD AUTO: 0.2 X10^3/UL (ref 0–0.2)
EOSINOPHIL NFR BLD AUTO: 3.2 % (ref 0.9–2.9)
ERYTHROCYTE [DISTWIDTH] IN BLOOD BY AUTOMATED COUNT: 15.2 % (ref 11.6–16.5)
FRACTIONATED INSPIRED OXYGEN: 100
FRACTIONATED INSPIRED OXYGEN: 40
GLUCOSE UR QL STRIP.AUTO: NEGATIVE
HCO3 BLDA-SCNC: 25.6 MMOL/L (ref 22–26)
HCO3 BLDA-SCNC: 27.2 MMOL/L (ref 22–26)
HCT VFR BLD AUTO: 39.2 % (ref 42–54)
HGB BLD-MCNC: 13 G/DL (ref 13.5–18)
KETONES UR QL STRIP.AUTO: NEGATIVE
LEUKOCYTE ESTERASE UR QL STRIP.AUTO: NEGATIVE
LYMPHOCYTES # BLD AUTO: 1.8 X10^3/UL (ref 1.3–2.9)
LYMPHOCYTES NFR BLD AUTO: 29.5 % (ref 21–51)
MAGNESIUM SERPL-MCNC: 1.5 MG/DL (ref 1.7–2.9)
MCH RBC QN AUTO: 29.1 PG (ref 27–34)
MCHC RBC AUTO-ENTMCNC: 33.2 G/DL (ref 33–35)
MCV RBC AUTO: 87.8 FL (ref 80–100)
MONOCYTES # BLD AUTO: 0.6 X10^3/UL (ref 0.3–0.8)
MONOCYTES NFR BLD AUTO: 8.9 % (ref 0–13)
NEUTROPHILS # BLD AUTO: 3.6 X10^3/UL (ref 2.2–4.8)
NEUTROPHILS NFR BLD AUTO: 57.6 % (ref 42–75)
NITRITE UR QL STRIP.AUTO: NEGATIVE
PCO2 BLDA: 52 MMHG (ref 35–45)
PCO2 BLDA: 68 MMHG (ref 35–45)
PH BLDA: 7.21 [PH] (ref 7.35–7.45)
PH BLDA: 7.3 [PH] (ref 7.35–7.45)
PH UR STRIP.AUTO: 6 [PH] (ref 5–8)
PLATELET # BLD: 285 X10^3/UL (ref 150–450)
PMV BLD AUTO: 8 FL (ref 7.4–11)
PO2 BLDA: 170 MMHG (ref 80–100)
PO2 BLDA: 209 MMHG (ref 80–100)
PROT SERPL-MCNC: 7.6 G/DL (ref 6.4–8.2)
PROT UR QL STRIP.AUTO: (no result)
RBC # BLD AUTO: 4.46 X10^6/UL (ref 4.7–6)
RBC # UR STRIP.AUTO: (no result) /UL
RBC #/AREA URNS HPF: (no result) /HPF
SAO2 % BLDA: 100 % (ref 90–100)
SAO2 % BLDA: 99 % (ref 90–100)
SODIUM SERPL-SCNC: 142 MMOL/L (ref 136–145)
SP GR UR STRIP.AUTO: 1.01 (ref 1–1.03)
SQUAMOUS URNS QL MICRO: (no result) /HPF
TROPONIN I SERPL-MCNC: 0.08 NG/ML (ref 0–1.5)
TROPONIN I SERPL-MCNC: 0.09 NG/ML (ref 0–1.5)
UROBILINOGEN UR QL STRIP.AUTO: NORMAL
WBC NRBC COR # BLD AUTO: 6.2 X10^3/UL (ref 3.6–10)

## 2018-05-23 PROCEDURE — 93005 ELECTROCARDIOGRAM TRACING: CPT

## 2018-05-23 PROCEDURE — 87205 SMEAR GRAM STAIN: CPT

## 2018-05-23 PROCEDURE — 85610 PROTHROMBIN TIME: CPT

## 2018-05-23 PROCEDURE — 94002 VENT MGMT INPAT INIT DAY: CPT

## 2018-05-23 PROCEDURE — 96374 THER/PROPH/DIAG INJ IV PUSH: CPT

## 2018-05-23 PROCEDURE — 87086 URINE CULTURE/COLONY COUNT: CPT

## 2018-05-23 PROCEDURE — 84484 ASSAY OF TROPONIN QUANT: CPT

## 2018-05-23 PROCEDURE — 71045 X-RAY EXAM CHEST 1 VIEW: CPT

## 2018-05-23 PROCEDURE — G6040 ASSAY OF ETHANOL: HCPCS

## 2018-05-23 PROCEDURE — 85730 THROMBOPLASTIN TIME PARTIAL: CPT

## 2018-05-23 PROCEDURE — 87186 SC STD MICRODIL/AGAR DIL: CPT

## 2018-05-23 PROCEDURE — 82550 ASSAY OF CK (CPK): CPT

## 2018-05-23 PROCEDURE — 71275 CT ANGIOGRAPHY CHEST: CPT

## 2018-05-23 PROCEDURE — 93010 ELECTROCARDIOGRAM REPORT: CPT

## 2018-05-23 PROCEDURE — 94660 CPAP INITIATION&MGMT: CPT

## 2018-05-23 PROCEDURE — 87077 CULTURE AEROBIC IDENTIFY: CPT

## 2018-05-23 PROCEDURE — 96367 TX/PROPH/DG ADDL SEQ IV INF: CPT

## 2018-05-23 PROCEDURE — 85025 COMPLETE CBC W/AUTO DIFF WBC: CPT

## 2018-05-23 PROCEDURE — 81001 URINALYSIS AUTO W/SCOPE: CPT

## 2018-05-23 PROCEDURE — G0378 HOSPITAL OBSERVATION PER HR: HCPCS

## 2018-05-23 PROCEDURE — 99284 EMERGENCY DEPT VISIT MOD MDM: CPT

## 2018-05-23 PROCEDURE — 36600 WITHDRAWAL OF ARTERIAL BLOOD: CPT

## 2018-05-23 PROCEDURE — 80307 DRUG TEST PRSMV CHEM ANLYZR: CPT

## 2018-05-23 PROCEDURE — 94640 AIRWAY INHALATION TREATMENT: CPT

## 2018-05-23 PROCEDURE — 83735 ASSAY OF MAGNESIUM: CPT

## 2018-05-23 PROCEDURE — 99285 EMERGENCY DEPT VISIT HI MDM: CPT

## 2018-05-23 PROCEDURE — 36415 COLL VENOUS BLD VENIPUNCTURE: CPT

## 2018-05-23 PROCEDURE — 96375 TX/PRO/DX INJ NEW DRUG ADDON: CPT

## 2018-05-23 PROCEDURE — 96365 THER/PROPH/DIAG IV INF INIT: CPT

## 2018-05-23 PROCEDURE — 82553 CREATINE MB FRACTION: CPT

## 2018-05-23 PROCEDURE — 85378 FIBRIN DEGRADE SEMIQUANT: CPT

## 2018-05-23 PROCEDURE — 87070 CULTURE OTHR SPECIMN AEROBIC: CPT

## 2018-05-23 PROCEDURE — 80053 COMPREHEN METABOLIC PANEL: CPT

## 2018-05-23 PROCEDURE — 82803 BLOOD GASES ANY COMBINATION: CPT

## 2018-05-23 PROCEDURE — 74018 RADEX ABDOMEN 1 VIEW: CPT

## 2018-05-23 RX ADMIN — IPRATROPIUM BROMIDE AND ALBUTEROL SULFATE SCH EA: .5; 3 SOLUTION RESPIRATORY (INHALATION) at 16:54

## 2018-05-23 RX ADMIN — LABETALOL HYDROCHLORIDE PRN MLS/HR: 5 INJECTION, SOLUTION INTRAVENOUS at 22:29

## 2018-05-23 RX ADMIN — LABETALOL HYDROCHLORIDE PRN MLS/HR: 5 INJECTION, SOLUTION INTRAVENOUS at 18:20

## 2018-05-23 RX ADMIN — SODIUM CHLORIDE SCH MLS/HR: 9 INJECTION, SOLUTION INTRAVENOUS at 12:12

## 2018-05-23 RX ADMIN — IPRATROPIUM BROMIDE AND ALBUTEROL SULFATE SCH EA: .5; 3 SOLUTION RESPIRATORY (INHALATION) at 21:03

## 2018-05-23 RX ADMIN — SODIUM CHLORIDE SCH MLS/HR: 9 INJECTION, SOLUTION INTRAVENOUS at 23:00

## 2018-05-23 NOTE — RAD
HISTORY:  Respiratory distress



Study: Single view chest



Comparison: 11/18/2017



Findings:



Single portable view is submitted. There are chronic emphysematous changes present. No infiltrate, ef
fusion or pneumothorax identified. Stable mild cardiomegaly.  The soft tissues are unremarkable. 

 



IMPRESSION: 

 

1. Stable mild cardiomegaly and chronic emphysematous changes.







Reported By:Electronically Signed by CUCA ROSENBERG MD at 5/23/2018 12:50:21 PM

## 2018-05-23 NOTE — CT
History: Shortness of breath, elevated D-dimer



Study: CTA chest



Findings: Thin-section helical CT imaging through the chest is performed during the rapid intravenous
 administration of 75 mL of Omnipaque 350. Coronal and sagittal MIPS images are submitted as well. Th
ere is excellent opacification of the pulmonary arteries with no filling defect or vascular cut off t
o indicate pulmonary embolus. The thoracic aorta is normal in caliber. There are tiny bilateral pleur
al effusions with limited atelectasis in the posterior costophrenic angles. There is evidence of mild
 centrilobular pulmonary emphysema. Visualized upper abdominal structures appear unremarkable.



Impression: No evidence of pulmonary embolus.

Small bilateral pleural effusions with limited bibasilar atelectasis.







Reported By:Electronically Signed by HARSHA MORILLO MD at 5/23/2018 4:14:26 PM

## 2018-05-23 NOTE — DR.SOBA
HPI





- Time Seen


Time seen: 12:25





- Primary Care Physician


Primary Care Physician: EILEEN NUR





- Complaints


Chief Complaint Doctors Comments: Patient presented to the ED for severe 

shortness of breath of acute onset.  Patient with a history of COPD and has 

been seen several times in the past with severe respiratory distress.


Chief Complaint:: EMS OUT TO PT WITH SOB, AND UPON ARRIVAL PT WAS ON 0.5 02 LPM 

AND PT WAS IN RESP DISRESS NOTED LABORDED BREATHING AND RESP UP, NRP PLACED AND 

PT TT ER,,, SEE NURSES NOTES.





- Source


History Provided: Patient, EMS, Other





- Mode of Arrival


Mode of Arrival: EMS





- Timing


Onset of Chief Complaint: 05/23/18





PMH





- PMH


Past Medical History: Yes


Past Medical History: Arthritis, Asthma, CHF, COPD, CVA, Hypertension, PUD


Past Surgical History: Yes


Surgical History: Unknown





- Family History


History of Family Medical Conditions: Yes


Family Medical History: Hypertension





- Social History


Does patient currently use any type of tobacco product: Yes


Have you used tobacco products in the last 12 months: Yes


Type of Tobacco Use: Cigarettes


Does any household member use tobacco: No


Alcohol Use: None


Do you use any recreational Drugs:: No


Lives With: Family


Lives Where: Home





- infectious screening


In the last 2 months have you had wt loss of >10#?: NO


Have you had fever, night sweats or hemotysis?: No


Have you traveled outside the country in the last 6 months?: No


Isolation: Standard





ROS





- Review of Systems


Constitutional: Fatigue


Eyes: No Symptoms Reported


ENTM: No Symptoms Reported


Respiratoy: Non-Productive Cough, Short of Breath, Wheezing


Cardiovascular: No Symptoms Reported


Gastrointestinal/Abdominal: No Symptoms Reported


Genitourinary: No Symptoms Reported


Neurological: No Symptoms Reported


Musculoskeletal: No Symptoms Reported


Integumentary: No Symptoms Reported


Hematologic/Lymphatic: No Symptoms Reported


Endocrine: No Symptoms Reported


Psychiatric: No Symptoms Reported


All Other Systems: Reviewed and Negative





PE





- Vital Signs


Vitals: 





 





Temperature                      98.4 F


Pulse Rate [Apical]              80


Pulse Rate                       124


Respiratory Rate                 18


Blood Pressure [Right Arm]       138/75


Blood Pressure [Left Arm]        149/85


Blood Pressure                   252/176


O2 Sat by Pulse Oximetry         100











- General


Limitations: Physical Limitation


General Appearance: In Distress





- Head


Head Exam: Normal Inspection, Atraumatic





- Eyes


Eye exam: Normal Appearance, PERRL, EOMI





- Neck


Neck Exam: Normal Inspection





- Chest


Chest Inspection: Normal Inspection





- Respiratory


Respiratory Exam: Accessory Muscle Use, Prolonged Expiratory Phase


Respiratory Exam: Bilateral Clear to Auscultation





- Cardiovascular


Cardiovascular Exam: Tachycardia





- Abdominal Exam


Abdominal Exam: Normal Inspection, Normal Bowel Sounds


Abdominal Tenderness: negative: RUQ, RLQ, LUQ, LLQ, Epigastrium, Suprapubic, 

Diffuse, Mild, Moderate, Severe, Other





- Extremities


Extremities Exam: Normal Inspection





- Back


Back Exam: Normal Inspection





- Neurologic


Neurological Exam: Alert, Oriented X3, CN II-XII Intact





- Psychiatric


Psychiatric Exam: Anxious





- Skin


Skin Exam: Warm, Other (very diaphoretic)





Course





- Reevaluation


1st: Improved





ROR





- Labs Reviewed


Result Diagrams: 


 05/23/18 12:17





 05/23/18 12:17


Laboratory: 





 











WBC  6.2 X10^3/uL (3.6-10.0)   05/23/18  12:17    


 


RBC  4.46 X10^6/uL (4.7-6.0)  L  05/23/18  12:17    


 


Hgb  13.0 g/dL (13.5-18.0)  L  05/23/18  12:17    


 


Hct  39.2 % (42.0-54.0)  L  05/23/18  12:17    


 


MCV  87.8 fL (80.0-100.0)   05/23/18  12:17    


 


MCH  29.1 pg (27.0-34.0)   05/23/18  12:17    


 


MCHC  33.2 g/dL (33.0-35.0)   05/23/18  12:17    


 


RDW  15.2 % (11.6-16.5)   05/23/18  12:17    


 


Plt Count  285 X10^3/uL (150.0-450.0)   05/23/18  12:17    


 


MPV  8.0 fL (7.4-11.0)   05/23/18  12:17    


 


Neut % (Auto)  57.6 % (42.0-75.0)   05/23/18  12:17    


 


Lymph % (Auto)  29.5 % (21.0-51.0)   05/23/18  12:17    


 


Mono % (Auto)  8.9 % (0.0-13.0)   05/23/18  12:17    


 


Eos % (Auto)  3.2 % (0.9-2.9)  H  05/23/18  12:17    


 


Baso % (Auto)  0.8 % (0.2-1.0)   05/23/18  12:17    


 


Neut # (Auto)  3.6 x10^3/uL (2.2-4.8)   05/23/18  12:17    


 


Lymph # (Auto)  1.8 X10^3/uL (1.3-2.9)   05/23/18  12:17    


 


Mono # (Auto)  0.6 x10^3/uL (0.3-0.8)   05/23/18  12:17    


 


Eos # (Auto)  0.2 x10^3/uL (0.0-0.2)   05/23/18  12:17    


 


Baso # (Auto)  0.1 X10^3/uL (0.0-0.1)   05/23/18  12:17    


 


Absolute Nucleated RBC  0.0 /100WBC  05/23/18  12:17    


 


INR Target Range  -   05/23/18  12:17    


 


INR  0.97  (0.8-1.3)   05/23/18  12:17    


 


APTT  27.7 SECONDS (22.9-36.5)   05/23/18  12:17    


 


PTT Comment  -   05/23/18  12:17    


 


D-Dimer  985 ng/mL (0-400)  H*  05/23/18  12:17    


 


Sample Site  Rra   05/23/18  12:18    


 


ABG pH  7.210  (7.35-7.45)  L  05/23/18  12:18    


 


ABG pCO2  68.0 mmHg (35.0-45.0)  H*  05/23/18  12:18    


 


ABG pO2  209.0 mmHg (80.0-100.0)  H  05/23/18  12:18    


 


ABG HCO3  27.2 mmol/L (22-26)  H  05/23/18  12:18    


 


ABG O2 Saturation  100.0 % ()   05/23/18  12:18    


 


ABG Base Excess  -2.0 mmol/L (-2.0-2.0)   05/23/18  12:18    


 


Eze Test  Pos   05/23/18  12:18    


 


A-a Gradient  419.0 mmHg  05/23/18  12:18    


 


FiO2  100.000   05/23/18  12:18    


 


Blood Gas Comments  Yaima well cn   05/23/18  12:18    


 


Sodium  142 mmol/L (136-145)   05/23/18  12:17    


 


Corrected Sodium  TNP   05/23/18  12:17    


 


Potassium  4.5 mmol/L (3.5-5.1)   05/23/18  12:17    


 


Chloride  109 mmol/L ()  H  05/23/18  12:17    


 


Carbon Dioxide  28.7 mmol/L (21-32)   05/23/18  12:17    


 


BUN  15 mg/dL (7-18)   05/23/18  12:17    


 


Creatinine  1.45 mg/dL (0.70-1.30)  H  05/23/18  12:17    


 


Est GFR (MDRD) Af Amer  > 60  (>60)   05/23/18  12:17    


 


Est GFR (MDRD) Non-Af  52  (>60)  L  05/23/18  12:17    


 


Glucose  88 mg/dL (65-99)   05/23/18  12:17    


 


Calcium  7.7 mg/dL (8.5-10.1)  L  05/23/18  12:17    


 


Corrected Calcium  8.4 mg/dL (8.5-10.1)  L  05/23/18  12:17    


 


Magnesium  1.5 mg/dL (1.7-2.9)  L  05/23/18  12:17    


 


Total Bilirubin  0.60 mg/dL (0.2-1.0)   05/23/18  12:17    


 


AST  31 Units/L (15-37)   05/23/18  12:17    


 


ALT  21 Units/L (12-78)   05/23/18  12:17    


 


Alkaline Phosphatase  82 Units/L ()   05/23/18  12:17    


 


Creatine Kinase  184 Units/L ()   05/23/18  12:17    


 


CK-MB (CK-2)  4.8 ng/mL (0-4.0)  H*  05/23/18  12:17    


 


CK/CKMB % Calc  2.6 % (<4)   05/23/18  12:17    


 


Troponin I  0.08 ng/mL (0-1.5)   05/23/18  12:17    


 


Total Protein  7.6 g/dL (6.4-8.2)   05/23/18  12:17    


 


Albumin  3.1 g/dL (3.4-5.0)  L  05/23/18  12:17    


 


Globulin  4.5 g/dL (2.5-4.5)   05/23/18  12:17    


 


Albumin/Globulin Ratio  0.7 Ratio (1.1-2.1)  L  05/23/18  12:17    














- XRAY


XRAY Interpreted by: Radiologist (Chest; There are chronic emphysematous 

changes present.  No infiltrate, effusion or penumothorax identified.  Stable 

mild cardiomegaly)





- Diagnosis


Discharge Problem: 


 Left ankle sprain








- Discharge Plan


Condition: Stable





- Follow ups/Referrals


Follow ups/Referrals: 


MARISOL ARELLANO [Primary Care Provider] - 3 days





- Instructions

## 2018-05-24 LAB
ALBUMIN SERPL BCP-MCNC: 2.4 G/DL (ref 3.4–5)
ALP SERPL-CCNC: 67 UNITS/L (ref 46–116)
ALT SERPL W P-5'-P-CCNC: 21 UNITS/L (ref 12–78)
ARTERIAL PATENCY WRIST A: (no result)
AST SERPL W P-5'-P-CCNC: 23 UNITS/L (ref 15–37)
BASE EXCESS BLDA CALC-SCNC: -6 MMOL/L (ref -2–2)
BASOPHILS # BLD AUTO: 0 X10^3/UL (ref 0–0.1)
BASOPHILS NFR BLD AUTO: 0.2 % (ref 0.2–1)
BUN SERPL-MCNC: 22 MG/DL (ref 7–18)
CALCIUM ALBUM COR SERPL-SCNC: 140 MMOL/L (ref 136–145)
CALCIUM ALBUM COR SERPL-SCNC: 8.6 MG/DL (ref 8.5–10.1)
CALCIUM SERPL-MCNC: 7.3 MG/DL (ref 8.5–10.1)
CHLORIDE SERPL-SCNC: 109 MMOL/L (ref 98–107)
CK MB SERPL-MCNC: 5.1 NG/ML (ref 0–4)
CK SERPL-CCNC: 145 UNITS/L (ref 39–308)
CKMB %: 3.5 % (ref ?–4)
CO2 SERPL-SCNC: 25.5 MMOL/L (ref 21–32)
CREAT SERPL-MCNC: 1.27 MG/DL (ref 0.7–1.3)
EGFR  BLACK RACES: > 60 (ref 60–?)
EOSINOPHIL # BLD AUTO: 0 X10^3/UL (ref 0–0.2)
EOSINOPHIL NFR BLD AUTO: 0 % (ref 0.9–2.9)
ERYTHROCYTE [DISTWIDTH] IN BLOOD BY AUTOMATED COUNT: 14.9 % (ref 11.6–16.5)
HCO3 BLDA-SCNC: 21.1 MMOL/L (ref 22–26)
HCT VFR BLD AUTO: 35.3 % (ref 42–54)
HGB BLD-MCNC: 11.7 G/DL (ref 13.5–18)
LYMPHOCYTES # BLD AUTO: 0.5 X10^3/UL (ref 1.3–2.9)
LYMPHOCYTES NFR BLD AUTO: 9.4 % (ref 21–51)
MCH RBC QN AUTO: 29 PG (ref 27–34)
MCHC RBC AUTO-ENTMCNC: 33.1 G/DL (ref 33–35)
MCV RBC AUTO: 87.7 FL (ref 80–100)
MONOCYTES # BLD AUTO: 0.2 X10^3/UL (ref 0.3–0.8)
MONOCYTES NFR BLD AUTO: 3.1 % (ref 0–13)
NEUTROPHILS # BLD AUTO: 4.4 X10^3/UL (ref 2.2–4.8)
NEUTROPHILS NFR BLD AUTO: 87.3 % (ref 42–75)
PCO2 BLDA: 47 MMHG (ref 35–45)
PH BLDA: 7.26 [PH] (ref 7.35–7.45)
PLATELET # BLD: 247 X10^3/UL (ref 150–450)
PMV BLD AUTO: 8.5 FL (ref 7.4–11)
PO2 BLDA: 111 MMHG (ref 80–100)
PROT SERPL-MCNC: 6 G/DL (ref 6.4–8.2)
RBC # BLD AUTO: 4.02 X10^6/UL (ref 4.7–6)
SAO2 % BLDA: 98 % (ref 90–100)
SODIUM SERPL-SCNC: 139 MMOL/L (ref 136–145)
TROPONIN I SERPL-MCNC: 0.05 NG/ML (ref 0–1.5)
WBC NRBC COR # BLD AUTO: 5 X10^3/UL (ref 3.6–10)

## 2018-05-24 RX ADMIN — SODIUM CHLORIDE SCH MLS/HR: 9 INJECTION, SOLUTION INTRAVENOUS at 20:59

## 2018-05-24 RX ADMIN — IPRATROPIUM BROMIDE AND ALBUTEROL SULFATE SCH EA: .5; 3 SOLUTION RESPIRATORY (INHALATION) at 12:59

## 2018-05-24 RX ADMIN — HYDROXYZINE PAMOATE PRN MG: 25 CAPSULE ORAL at 20:59

## 2018-05-24 RX ADMIN — IPRATROPIUM BROMIDE AND ALBUTEROL SULFATE SCH EA: .5; 3 SOLUTION RESPIRATORY (INHALATION) at 09:16

## 2018-05-24 RX ADMIN — SODIUM CHLORIDE SCH: 9 INJECTION, SOLUTION INTRAVENOUS at 13:21

## 2018-05-24 RX ADMIN — AMLODIPINE BESYLATE SCH MG: 5 TABLET ORAL at 07:46

## 2018-05-24 RX ADMIN — IPRATROPIUM BROMIDE AND ALBUTEROL SULFATE SCH EA: .5; 3 SOLUTION RESPIRATORY (INHALATION) at 21:40

## 2018-05-24 RX ADMIN — IPRATROPIUM BROMIDE AND ALBUTEROL SULFATE SCH EA: .5; 3 SOLUTION RESPIRATORY (INHALATION) at 01:00

## 2018-05-24 RX ADMIN — IPRATROPIUM BROMIDE AND ALBUTEROL SULFATE SCH EA: .5; 3 SOLUTION RESPIRATORY (INHALATION) at 17:15

## 2018-05-24 RX ADMIN — DEXTROSE MONOHYDRATE SCH: 50 INJECTION, SOLUTION INTRAVENOUS at 12:24

## 2018-05-24 RX ADMIN — DEXTROSE MONOHYDRATE SCH MLS/HR: 50 INJECTION, SOLUTION INTRAVENOUS at 07:49

## 2018-05-24 RX ADMIN — SODIUM CHLORIDE SCH MLS/HR: 9 INJECTION, SOLUTION INTRAVENOUS at 15:11

## 2018-05-24 RX ADMIN — HYDROXYZINE PAMOATE PRN MG: 25 CAPSULE ORAL at 13:28

## 2018-05-24 RX ADMIN — ASPIRIN 325 MG ORAL TABLET SCH MG: 325 PILL ORAL at 19:06

## 2018-05-24 RX ADMIN — AMLODIPINE BESYLATE SCH: 5 TABLET ORAL at 12:24

## 2018-05-24 RX ADMIN — IPRATROPIUM BROMIDE AND ALBUTEROL SULFATE SCH EA: .5; 3 SOLUTION RESPIRATORY (INHALATION) at 05:11

## 2018-05-24 NOTE — DR.H&P
H&P





- History & Physical for Day of:


H&P Date: 05/23/18





- Chief Complaint


Chief Complaint: SOB, RESP DISTRESS





- Allergies


Allergies/Adverse Reactions: 


 Allergies











Allergy/AdvReac Type Severity Reaction Status Date / Time


 


lisinopril Allergy   Verified 09/16/17 06:21














- History of Present Illness


History of Present Illness: Patient presented to the ED for severe shortness of 

breath of acute onset. Patient with a history of COPD and has been seen several 

times in the past with severe respiratory distress. Pt admitted to ICU for 

treatment and evaluation of SOB.





- Past Medical History


Past Medical History: Arthritis, Asthma, CHF, COPD, CVA, Hypertension, PUD





- Past Surgical History


Surgical History: Unknown


Additional Surgical History: Prostate Surgery, Right Cataract Surgery





- Family History


Family Medical History: Hypertension





- Social History


Does patient currently use any type of tobacco product: Yes


Have you used tobacco products in the last 12 months: Yes


Type of Tobacco Use: Cigarettes


Does any household member use tobacco: No


Alcohol Use: Occasionally


Drug Use: None





- Medications


Home Medications: 


 





No Known Home Medications 1 tab PO .NONE 05/23/18 [History Confirmed 05/23/18]











- Review of Systems


Constitutional: Weakness


Eyes: No Symptoms Reported


ENT: No Symptoms Reported


Respiratory: Cough, Shortness of Breath, SOB with Excertion, Wheezing


Cardiovascular: Chest Pain


Gastrointestinal: No Symptoms Reported


Genitourinary: No Symptoms Reported


Skin: No Symptoms Reported


Neurological: Weakness





- Physical Exam


Vital Signs: 


 





Temperature                      97.6 F


Pulse Rate [Apical]              85


Pulse Rate                       87


Respiratory Rate                 20


Blood Pressure [Right Arm]       138/75


Blood Pressure [Left Arm]        156/83


Blood Pressure                   252/176


O2 Sat by Pulse Oximetry         100








Oriented: Normal


Eyes: Normal


Ear: Normal


Nose: Normal


Throat: Normal


Respiratory: Diminished Throughout, Wheezes Throughout


Cardiovascular: Tachycardia.  negative: Edema


: Normal


Auscultation: Bowel Sounds: Normal


Palpation: Normal


Tenderness: Normal


Skin: Decreased Turgur


Musculoskeletal: Back:Lumbar


Psychiatric: Normal, Agitation


Mood Description: Anxious


Affect: Anxious


Speech Pattern: Clear





- Assessment/Plan


(1) Acute and chronic respiratory failure (acute-on-chronic)


Qualifiers: 


 


Status: Acute   


Plan: ADMIT ICU, RESP THERAPY.  SUPPLEMENTAL O2.  IV STEROIDS, SERIAL EKG'S 

CARDIAC ENZYMES.  VERIFY HOME MEDS, CONTINUOUS CARDIAC MONITOR.  BP MONITORING 

LABETALOL PROTOCOL   





(2) CAD (coronary artery disease)


Status: Acute   





(3) CHF (congestive heart failure), NYHA class II


Status: Acute   





(4) COPD (chronic obstructive pulmonary disease)


Qualifiers: 


 


Status: Acute   





(5) CVA (cerebral vascular accident)


Status: Acute

## 2018-05-25 VITALS — SYSTOLIC BLOOD PRESSURE: 163 MMHG | DIASTOLIC BLOOD PRESSURE: 88 MMHG

## 2018-05-25 LAB
ALBUMIN SERPL BCP-MCNC: 2.4 G/DL (ref 3.4–5)
ALP SERPL-CCNC: 70 UNITS/L (ref 46–116)
ALT SERPL W P-5'-P-CCNC: 21 UNITS/L (ref 12–78)
ARTERIAL PATENCY WRIST A: (no result)
AST SERPL W P-5'-P-CCNC: 23 UNITS/L (ref 15–37)
BASE EXCESS BLDA CALC-SCNC: -7.6 MMOL/L (ref -2–2)
BASOPHILS # BLD AUTO: 0 X10^3/UL (ref 0–0.1)
BASOPHILS NFR BLD AUTO: 0.3 % (ref 0.2–1)
BUN SERPL-MCNC: 24 MG/DL (ref 7–18)
CALCIUM ALBUM COR SERPL-SCNC: 142 MMOL/L (ref 136–145)
CALCIUM ALBUM COR SERPL-SCNC: 8.5 MG/DL (ref 8.5–10.1)
CALCIUM SERPL-MCNC: 7.2 MG/DL (ref 8.5–10.1)
CHLORIDE SERPL-SCNC: 110 MMOL/L (ref 98–107)
CK MB SERPL-MCNC: 4.3 NG/ML (ref 0–4)
CK MB SERPL-MCNC: 4.7 NG/ML (ref 0–4)
CK SERPL-CCNC: 305 UNITS/L (ref 39–308)
CK SERPL-CCNC: 442 UNITS/L (ref 39–308)
CKMB %: 1 % (ref ?–4)
CKMB %: 1.5 % (ref ?–4)
CO2 SERPL-SCNC: 21 MMOL/L (ref 21–32)
CREAT SERPL-MCNC: 1.36 MG/DL (ref 0.7–1.3)
EGFR  BLACK RACES: > 60 (ref 60–?)
EOSINOPHIL # BLD AUTO: 0 X10^3/UL (ref 0–0.2)
EOSINOPHIL NFR BLD AUTO: 0 % (ref 0.9–2.9)
ERYTHROCYTE [DISTWIDTH] IN BLOOD BY AUTOMATED COUNT: 14.9 % (ref 11.6–16.5)
FRACTIONATED INSPIRED OXYGEN: 40
HCO3 BLDA-SCNC: 20.4 MMOL/L (ref 22–26)
HCT VFR BLD AUTO: 36.7 % (ref 42–54)
HGB BLD-MCNC: 12 G/DL (ref 13.5–18)
LYMPHOCYTES # BLD AUTO: 0.3 X10^3/UL (ref 1.3–2.9)
LYMPHOCYTES NFR BLD AUTO: 3.8 % (ref 21–51)
MCH RBC QN AUTO: 29.2 PG (ref 27–34)
MCHC RBC AUTO-ENTMCNC: 32.8 G/DL (ref 33–35)
MCV RBC AUTO: 88.9 FL (ref 80–100)
MONOCYTES # BLD AUTO: 0.1 X10^3/UL (ref 0.3–0.8)
MONOCYTES NFR BLD AUTO: 1.6 % (ref 0–13)
NEUTROPHILS # BLD AUTO: 8.3 X10^3/UL (ref 2.2–4.8)
NEUTROPHILS NFR BLD AUTO: 94.3 % (ref 42–75)
NEUTS BAND NFR BLD: 1 % (ref 0–10)
PCO2 BLDA: 51 MMHG (ref 35–45)
PCO2 BLDA: 80 MMHG (ref 35–45)
PH BLDA: 7.21 [PH] (ref 7.35–7.45)
PLAT MORPH BLD: NORMAL
PLATELET # BLD: 241 X10^3/UL (ref 150–450)
PMV BLD AUTO: 8.6 FL (ref 7.4–11)
PO2 BLDA: 125 MMHG (ref 80–100)
PO2 BLDA: 24 MMHG (ref 80–100)
PROT SERPL-MCNC: 6 G/DL (ref 6.4–8.2)
RBC # BLD AUTO: 4.13 X10^6/UL (ref 4.7–6)
RBC MORPH BLD: NORMAL
SAO2 % BLDA: 23 % (ref 90–100)
SAO2 % BLDA: 98 % (ref 90–100)
SODIUM SERPL-SCNC: 140 MMOL/L (ref 136–145)
TROPONIN I SERPL-MCNC: 0.03 NG/ML (ref 0–1.5)
TROPONIN I SERPL-MCNC: 0.04 NG/ML (ref 0–1.5)
WBC NRBC COR # BLD AUTO: 8.8 X10^3/UL (ref 3.6–10)

## 2018-05-25 PROCEDURE — 0BH17EZ INSERTION OF ENDOTRACHEAL AIRWAY INTO TRACHEA, VIA NATURAL OR ARTIFICIAL OPENING: ICD-10-PCS

## 2018-05-25 PROCEDURE — 5A1935Z RESPIRATORY VENTILATION, LESS THAN 24 CONSECUTIVE HOURS: ICD-10-PCS

## 2018-05-25 PROCEDURE — 0D9670Z DRAINAGE OF STOMACH WITH DRAINAGE DEVICE, VIA NATURAL OR ARTIFICIAL OPENING: ICD-10-PCS | Performed by: INTERNAL MEDICINE

## 2018-05-25 RX ADMIN — AMLODIPINE BESYLATE SCH MG: 5 TABLET ORAL at 08:40

## 2018-05-25 RX ADMIN — PROPOFOL PRN MLS/HR: 10 INJECTION, EMULSION INTRAVENOUS at 19:46

## 2018-05-25 RX ADMIN — LABETALOL HYDROCHLORIDE PRN MLS/HR: 5 INJECTION, SOLUTION INTRAVENOUS at 03:57

## 2018-05-25 RX ADMIN — DEXTROSE MONOHYDRATE SCH MLS/HR: 50 INJECTION, SOLUTION INTRAVENOUS at 08:40

## 2018-05-25 RX ADMIN — PROPOFOL PRN MLS/HR: 10 INJECTION, EMULSION INTRAVENOUS at 12:15

## 2018-05-25 RX ADMIN — SODIUM CHLORIDE SCH: 9 INJECTION, SOLUTION INTRAVENOUS at 07:18

## 2018-05-25 RX ADMIN — IPRATROPIUM BROMIDE AND ALBUTEROL SULFATE SCH EA: .5; 3 SOLUTION RESPIRATORY (INHALATION) at 13:50

## 2018-05-25 RX ADMIN — IPRATROPIUM BROMIDE AND ALBUTEROL SULFATE SCH EA: .5; 3 SOLUTION RESPIRATORY (INHALATION) at 00:57

## 2018-05-25 RX ADMIN — IPRATROPIUM BROMIDE AND ALBUTEROL SULFATE SCH EA: .5; 3 SOLUTION RESPIRATORY (INHALATION) at 08:56

## 2018-05-25 RX ADMIN — IPRATROPIUM BROMIDE AND ALBUTEROL SULFATE SCH EA: .5; 3 SOLUTION RESPIRATORY (INHALATION) at 17:45

## 2018-05-25 RX ADMIN — ASPIRIN 325 MG ORAL TABLET SCH MG: 325 PILL ORAL at 08:40

## 2018-05-25 RX ADMIN — SODIUM CHLORIDE SCH MLS/HR: 9 INJECTION, SOLUTION INTRAVENOUS at 06:09

## 2018-05-25 RX ADMIN — IPRATROPIUM BROMIDE AND ALBUTEROL SULFATE SCH EA: .5; 3 SOLUTION RESPIRATORY (INHALATION) at 04:51

## 2018-05-25 RX ADMIN — LABETALOL HYDROCHLORIDE PRN MLS/HR: 5 INJECTION, SOLUTION INTRAVENOUS at 07:50

## 2018-05-25 RX ADMIN — HYDROXYZINE PAMOATE PRN MG: 25 CAPSULE ORAL at 06:09

## 2018-05-25 NOTE — RAD
Examination: Portable KUB



History: NG placement



Findings: NG tube tip appears to be in the lower esophagus well above the gastroesophageal junction. 
The visualized intestinal gas pattern is normal. The lung bases are clear.



Impression: Findings  indicate NG tube is in the lower esophagus.



Reported By:Electronically Signed by JAVIER MANCILLA MD at 5/25/2018 2:46:29 PM

## 2018-05-25 NOTE — DR.UPDATE
H&P Update


History and Physical Update: 


History and Physical reviewed and patient examined.





Changes noted: NO       Yes with the following:agree with H&P. Pt in acute 

respiratory distress requiring intubation.








Procedures (ALL)





- Intubation


Time out performed: Yes


Sedative: other (propofol 200mg)


paralytic: succinylchline (100mg)


Laryngoscope: bryan (3 x 1 att)


ET tube size: 8


Tube secured depth: 22 at gum


Tube secured location: other


Tube placement confirmation: visualized tube passing through cords, equal 

breath sounds bilaterally, no breath sounds over epigastrium, comfirmation by 

capnometer


Patient tolerated procedure: Yes


Intubation complications: none

## 2018-05-25 NOTE — RAD
Examination: Portable AP chest



History: ET placement



Comparison reference 05/25/2018



Findings: Stable cardiomegaly with essentially clear peripheral lungs and pleural spaces. Endotrachea
l tube is in mid trachea, well above the aleksandr. No pneumothorax seen.



Impression: Interval intubation. 

Reported By:Electronically Signed by JAVIER MANCILLA MD at 5/25/2018 12:17:29 PM

## 2018-05-25 NOTE — RAD
HISTORY:  NG tube placement



Study: Single view chest



Comparison: 05/25/2018



Findings:



Endotracheal terminates 5 cm from the aleksandr. Enteric tube is seen extending into the region of the s
tomach with tip outside the field of view stable pulmonary vascular congestion and cardiomegaly. No p
neumothorax identified.



IMPRESSION: 

 

1. Endotracheal tube in satisfactory position.

2. Enteric tube is seen in the region of the stomach with tip outside the field of view.







Reported By:Electronically Signed by CUCA ROSENBERG MD at 5/25/2018 7:02:59 PM

## 2018-05-25 NOTE — RAD
HISTORY:  Unresponsive, respiratory distress, CO, hypertensive



Study:  Single-view chest. Exam done 05/25/2018 at 11:46 a.m. 



Comparison: CT chest done 05/23/2018



Findings:



Trachea is midline. There is cardiomegaly with aortic uncoiling. There is hyperinflation of the lungs
. Increased interstitial markings likely represent COPD in the appropriate clinical setting. No conso
lidation, pneumothorax or radiographically significant pleural fluid is seen. Osseous structures are 
intact.



IMPRESSION:

 

Hypertensive configuration.



Changes of COPD. No radiographically significant pleural fluid or pneumothorax is seen.







Reported By:Electronically Signed by FAITH HARRISON MD at 5/25/2018 12:20:29 PM